# Patient Record
Sex: FEMALE | Race: WHITE | NOT HISPANIC OR LATINO | Employment: OTHER | URBAN - METROPOLITAN AREA
[De-identification: names, ages, dates, MRNs, and addresses within clinical notes are randomized per-mention and may not be internally consistent; named-entity substitution may affect disease eponyms.]

---

## 2023-03-27 LAB
ALANINE AMINOTRANSFERASE (SGPT) (U/L) IN SER/PLAS: 22 U/L (ref 7–45)
ALBUMIN (G/DL) IN SER/PLAS: 4.5 G/DL (ref 3.4–5)
ALKALINE PHOSPHATASE (U/L) IN SER/PLAS: 53 U/L (ref 33–136)
ANION GAP IN SER/PLAS: 13 MMOL/L (ref 10–20)
ASPARTATE AMINOTRANSFERASE (SGOT) (U/L) IN SER/PLAS: 23 U/L (ref 9–39)
BASOPHILS (10*3/UL) IN BLOOD BY AUTOMATED COUNT: 0.05 X10E9/L (ref 0–0.1)
BASOPHILS/100 LEUKOCYTES IN BLOOD BY AUTOMATED COUNT: 0.9 % (ref 0–2)
BILIRUBIN TOTAL (MG/DL) IN SER/PLAS: 0.7 MG/DL (ref 0–1.2)
CALCIUM (MG/DL) IN SER/PLAS: 9.8 MG/DL (ref 8.6–10.3)
CARBON DIOXIDE, TOTAL (MMOL/L) IN SER/PLAS: 30 MMOL/L (ref 21–32)
CHLORIDE (MMOL/L) IN SER/PLAS: 95 MMOL/L (ref 98–107)
CHOLESTEROL (MG/DL) IN SER/PLAS: 138 MG/DL (ref 0–199)
CHOLESTEROL IN HDL (MG/DL) IN SER/PLAS: 58.5 MG/DL
CHOLESTEROL/HDL RATIO: 2.4
CREATININE (MG/DL) IN SER/PLAS: 1.07 MG/DL (ref 0.5–1.05)
EOSINOPHILS (10*3/UL) IN BLOOD BY AUTOMATED COUNT: 0.05 X10E9/L (ref 0–0.7)
EOSINOPHILS/100 LEUKOCYTES IN BLOOD BY AUTOMATED COUNT: 0.9 % (ref 0–6)
ERYTHROCYTE DISTRIBUTION WIDTH (RATIO) BY AUTOMATED COUNT: 13.7 % (ref 11.5–14.5)
ERYTHROCYTE MEAN CORPUSCULAR HEMOGLOBIN CONCENTRATION (G/DL) BY AUTOMATED: 32.5 G/DL (ref 32–36)
ERYTHROCYTE MEAN CORPUSCULAR VOLUME (FL) BY AUTOMATED COUNT: 91 FL (ref 80–100)
ERYTHROCYTES (10*6/UL) IN BLOOD BY AUTOMATED COUNT: 4.21 X10E12/L (ref 4–5.2)
ESTIMATED AVERAGE GLUCOSE FOR HBA1C: 183 MG/DL
GFR FEMALE: 57 ML/MIN/1.73M2
GLUCOSE (MG/DL) IN SER/PLAS: 202 MG/DL (ref 74–99)
HEMATOCRIT (%) IN BLOOD BY AUTOMATED COUNT: 38.2 % (ref 36–46)
HEMOGLOBIN (G/DL) IN BLOOD: 12.4 G/DL (ref 12–16)
HEMOGLOBIN A1C/HEMOGLOBIN TOTAL IN BLOOD: 8 %
IMMATURE GRANULOCYTES/100 LEUKOCYTES IN BLOOD BY AUTOMATED COUNT: 0.3 % (ref 0–0.9)
LDL: 65 MG/DL (ref 0–99)
LEUKOCYTES (10*3/UL) IN BLOOD BY AUTOMATED COUNT: 5.8 X10E9/L (ref 4.4–11.3)
LYMPHOCYTES (10*3/UL) IN BLOOD BY AUTOMATED COUNT: 1.52 X10E9/L (ref 1.2–4.8)
LYMPHOCYTES/100 LEUKOCYTES IN BLOOD BY AUTOMATED COUNT: 26 % (ref 13–44)
MONOCYTES (10*3/UL) IN BLOOD BY AUTOMATED COUNT: 0.46 X10E9/L (ref 0.1–1)
MONOCYTES/100 LEUKOCYTES IN BLOOD BY AUTOMATED COUNT: 7.9 % (ref 2–10)
NEUTROPHILS (10*3/UL) IN BLOOD BY AUTOMATED COUNT: 3.74 X10E9/L (ref 1.2–7.7)
NEUTROPHILS/100 LEUKOCYTES IN BLOOD BY AUTOMATED COUNT: 64 % (ref 40–80)
PLATELETS (10*3/UL) IN BLOOD AUTOMATED COUNT: 306 X10E9/L (ref 150–450)
POTASSIUM (MMOL/L) IN SER/PLAS: 5.3 MMOL/L (ref 3.5–5.3)
PROTEIN TOTAL: 6.8 G/DL (ref 6.4–8.2)
SODIUM (MMOL/L) IN SER/PLAS: 133 MMOL/L (ref 136–145)
THYROTROPIN (MIU/L) IN SER/PLAS BY DETECTION LIMIT <= 0.05 MIU/L: 1.38 MIU/L (ref 0.44–3.98)
THYROXINE (T4) FREE (NG/DL) IN SER/PLAS: 1.06 NG/DL (ref 0.61–1.12)
TRIGLYCERIDE (MG/DL) IN SER/PLAS: 73 MG/DL (ref 0–149)
UREA NITROGEN (MG/DL) IN SER/PLAS: 12 MG/DL (ref 6–23)
VLDL: 15 MG/DL (ref 0–40)

## 2023-07-24 LAB
ALANINE AMINOTRANSFERASE (SGPT) (U/L) IN SER/PLAS: 19 U/L (ref 7–45)
ALBUMIN (G/DL) IN SER/PLAS: 4.7 G/DL (ref 3.4–5)
ALKALINE PHOSPHATASE (U/L) IN SER/PLAS: 55 U/L (ref 33–136)
ANION GAP IN SER/PLAS: 13 MMOL/L (ref 10–20)
ASPARTATE AMINOTRANSFERASE (SGOT) (U/L) IN SER/PLAS: 20 U/L (ref 9–39)
BASOPHILS (10*3/UL) IN BLOOD BY AUTOMATED COUNT: 0.05 X10E9/L (ref 0–0.1)
BASOPHILS/100 LEUKOCYTES IN BLOOD BY AUTOMATED COUNT: 0.9 % (ref 0–2)
BILIRUBIN TOTAL (MG/DL) IN SER/PLAS: 0.5 MG/DL (ref 0–1.2)
CALCIDIOL (25 OH VITAMIN D3) (NG/ML) IN SER/PLAS: 39 NG/ML
CALCIUM (MG/DL) IN SER/PLAS: 10.1 MG/DL (ref 8.6–10.6)
CARBON DIOXIDE, TOTAL (MMOL/L) IN SER/PLAS: 30 MMOL/L (ref 21–32)
CHLORIDE (MMOL/L) IN SER/PLAS: 102 MMOL/L (ref 98–107)
CHOLESTEROL (MG/DL) IN SER/PLAS: 162 MG/DL (ref 0–199)
CHOLESTEROL IN HDL (MG/DL) IN SER/PLAS: 68.5 MG/DL
CHOLESTEROL/HDL RATIO: 2.4
CREATININE (MG/DL) IN SER/PLAS: 0.99 MG/DL (ref 0.5–1.05)
EOSINOPHILS (10*3/UL) IN BLOOD BY AUTOMATED COUNT: 0.06 X10E9/L (ref 0–0.7)
EOSINOPHILS/100 LEUKOCYTES IN BLOOD BY AUTOMATED COUNT: 1.1 % (ref 0–6)
ERYTHROCYTE DISTRIBUTION WIDTH (RATIO) BY AUTOMATED COUNT: 13.1 % (ref 11.5–14.5)
ERYTHROCYTE MEAN CORPUSCULAR HEMOGLOBIN CONCENTRATION (G/DL) BY AUTOMATED: 31.1 G/DL (ref 32–36)
ERYTHROCYTE MEAN CORPUSCULAR VOLUME (FL) BY AUTOMATED COUNT: 93 FL (ref 80–100)
ERYTHROCYTES (10*6/UL) IN BLOOD BY AUTOMATED COUNT: 4.39 X10E12/L (ref 4–5.2)
ESTIMATED AVERAGE GLUCOSE FOR HBA1C: 177 MG/DL
GFR FEMALE: 63 ML/MIN/1.73M2
GLUCOSE (MG/DL) IN SER/PLAS: 158 MG/DL (ref 74–99)
HEMATOCRIT (%) IN BLOOD BY AUTOMATED COUNT: 40.8 % (ref 36–46)
HEMOGLOBIN (G/DL) IN BLOOD: 12.7 G/DL (ref 12–16)
HEMOGLOBIN A1C/HEMOGLOBIN TOTAL IN BLOOD: 7.8 %
IMMATURE GRANULOCYTES/100 LEUKOCYTES IN BLOOD BY AUTOMATED COUNT: 0.2 % (ref 0–0.9)
LDL: 83 MG/DL (ref 0–99)
LEUKOCYTES (10*3/UL) IN BLOOD BY AUTOMATED COUNT: 5.3 X10E9/L (ref 4.4–11.3)
LYMPHOCYTES (10*3/UL) IN BLOOD BY AUTOMATED COUNT: 1.79 X10E9/L (ref 1.2–4.8)
LYMPHOCYTES/100 LEUKOCYTES IN BLOOD BY AUTOMATED COUNT: 33.6 % (ref 13–44)
MONOCYTES (10*3/UL) IN BLOOD BY AUTOMATED COUNT: 0.37 X10E9/L (ref 0.1–1)
MONOCYTES/100 LEUKOCYTES IN BLOOD BY AUTOMATED COUNT: 7 % (ref 2–10)
NEUTROPHILS (10*3/UL) IN BLOOD BY AUTOMATED COUNT: 3.04 X10E9/L (ref 1.2–7.7)
NEUTROPHILS/100 LEUKOCYTES IN BLOOD BY AUTOMATED COUNT: 57.2 % (ref 40–80)
NRBC (PER 100 WBCS) BY AUTOMATED COUNT: 0 /100 WBC (ref 0–0)
PLATELETS (10*3/UL) IN BLOOD AUTOMATED COUNT: 263 X10E9/L (ref 150–450)
POTASSIUM (MMOL/L) IN SER/PLAS: 5 MMOL/L (ref 3.5–5.3)
PROTEIN TOTAL: 6.8 G/DL (ref 6.4–8.2)
SODIUM (MMOL/L) IN SER/PLAS: 140 MMOL/L (ref 136–145)
THYROTROPIN (MIU/L) IN SER/PLAS BY DETECTION LIMIT <= 0.05 MIU/L: 1.37 MIU/L (ref 0.44–3.98)
THYROXINE (T4) FREE (NG/DL) IN SER/PLAS: 1.07 NG/DL (ref 0.78–1.48)
TRIGLYCERIDE (MG/DL) IN SER/PLAS: 54 MG/DL (ref 0–149)
UREA NITROGEN (MG/DL) IN SER/PLAS: 13 MG/DL (ref 6–23)
VLDL: 11 MG/DL (ref 0–40)

## 2023-10-24 DIAGNOSIS — R03.0 BLOOD PRESSURE ELEVATED WITHOUT HISTORY OF HTN: ICD-10-CM

## 2023-10-24 RX ORDER — LOSARTAN POTASSIUM 50 MG/1
50 TABLET ORAL DAILY
Qty: 90 TABLET | Refills: 0 | Status: SHIPPED | OUTPATIENT
Start: 2023-10-24 | End: 2024-01-08 | Stop reason: SDUPTHER

## 2023-10-24 RX ORDER — LOSARTAN POTASSIUM 50 MG/1
1 TABLET ORAL DAILY
COMMUNITY
Start: 2022-04-26 | End: 2023-10-24 | Stop reason: SDUPTHER

## 2023-10-30 ENCOUNTER — TELEPHONE (OUTPATIENT)
Dept: ENDOCRINOLOGY | Facility: CLINIC | Age: 68
End: 2023-10-30
Payer: MEDICARE

## 2023-10-30 RX ORDER — IBANDRONATE SODIUM 150 MG/1
TABLET, FILM COATED ORAL
COMMUNITY
End: 2023-11-27 | Stop reason: SDUPTHER

## 2023-10-30 RX ORDER — PEN NEEDLE, DIABETIC 32GX 5/32"
NEEDLE, DISPOSABLE MISCELLANEOUS
COMMUNITY
Start: 2023-09-12 | End: 2024-01-05 | Stop reason: SDUPTHER

## 2023-10-30 RX ORDER — VALACYCLOVIR HYDROCHLORIDE 1 G/1
1000 TABLET, FILM COATED ORAL DAILY
COMMUNITY

## 2023-10-30 RX ORDER — LANOLIN ALCOHOL/MO/W.PET/CERES
CREAM (GRAM) TOPICAL
COMMUNITY

## 2023-10-30 RX ORDER — ATORVASTATIN CALCIUM 40 MG/1
40 TABLET, FILM COATED ORAL DAILY
COMMUNITY
End: 2023-12-19 | Stop reason: SDUPTHER

## 2023-10-30 RX ORDER — INSULIN LISPRO 100 [IU]/ML
INJECTION, SOLUTION INTRAVENOUS; SUBCUTANEOUS
COMMUNITY

## 2023-10-30 RX ORDER — INSULIN GLARGINE 100 [IU]/ML
30 INJECTION, SOLUTION SUBCUTANEOUS NIGHTLY
COMMUNITY

## 2023-10-30 RX ORDER — BLOOD SUGAR DIAGNOSTIC
STRIP MISCELLANEOUS
COMMUNITY
Start: 2018-02-28

## 2023-10-30 RX ORDER — FLASH GLUCOSE SCANNING READER
EACH MISCELLANEOUS
COMMUNITY
Start: 2023-07-31

## 2023-10-30 RX ORDER — SEMAGLUTIDE 0.68 MG/ML
INJECTION, SOLUTION SUBCUTANEOUS
COMMUNITY
Start: 2023-10-19 | End: 2023-12-14 | Stop reason: SDUPTHER

## 2023-10-30 RX ORDER — FLASH GLUCOSE SENSOR
KIT MISCELLANEOUS
COMMUNITY
Start: 2023-08-15 | End: 2023-12-28 | Stop reason: SDUPTHER

## 2023-10-30 NOTE — TELEPHONE ENCOUNTER
Pt received her letters of denial for lantus and humalog. Pt would like to speak directly to you to see what you ot she can do to continue her on brand name insulin vs generics or alternative.

## 2023-11-27 ENCOUNTER — TELEPHONE (OUTPATIENT)
Dept: ENDOCRINOLOGY | Facility: CLINIC | Age: 68
End: 2023-11-27
Payer: MEDICARE

## 2023-11-27 DIAGNOSIS — E55.9 MILD VITAMIN D DEFICIENCY: ICD-10-CM

## 2023-11-27 DIAGNOSIS — E11.00 TYPE II DIABETES MELLITUS WITH HYPEROSMOLARITY, UNCONTROLLED (MULTI): ICD-10-CM

## 2023-11-27 DIAGNOSIS — E10.9 TYPE 1 DIABETES MELLITUS WITHOUT COMPLICATION (MULTI): Primary | ICD-10-CM

## 2023-11-27 DIAGNOSIS — I10 ESSENTIAL HYPERTENSION, BENIGN: ICD-10-CM

## 2023-11-27 DIAGNOSIS — D64.9 FATIGUE ASSOCIATED WITH ANEMIA: ICD-10-CM

## 2023-11-27 DIAGNOSIS — M81.0 OSTEOPOROSIS, UNSPECIFIED OSTEOPOROSIS TYPE, UNSPECIFIED PATHOLOGICAL FRACTURE PRESENCE: Primary | ICD-10-CM

## 2023-11-27 RX ORDER — IBANDRONATE SODIUM 150 MG/1
TABLET, FILM COATED ORAL
Qty: 3 TABLET | Refills: 3 | Status: SHIPPED | OUTPATIENT
Start: 2023-11-27

## 2023-11-27 NOTE — TELEPHONE ENCOUNTER
Pt has ov on 12/11/23 and would like to have any needed blood work done prior to OV. Patient states she will be fasting.

## 2023-12-04 ENCOUNTER — LAB (OUTPATIENT)
Dept: LAB | Facility: LAB | Age: 68
End: 2023-12-04
Payer: MEDICARE

## 2023-12-04 DIAGNOSIS — E55.9 MILD VITAMIN D DEFICIENCY: ICD-10-CM

## 2023-12-04 DIAGNOSIS — E10.9 TYPE 1 DIABETES MELLITUS WITHOUT COMPLICATION (MULTI): ICD-10-CM

## 2023-12-04 DIAGNOSIS — E11.00 TYPE II DIABETES MELLITUS WITH HYPEROSMOLARITY, UNCONTROLLED (MULTI): ICD-10-CM

## 2023-12-04 DIAGNOSIS — D64.9 FATIGUE ASSOCIATED WITH ANEMIA: ICD-10-CM

## 2023-12-04 DIAGNOSIS — I10 ESSENTIAL HYPERTENSION, BENIGN: ICD-10-CM

## 2023-12-04 LAB
25(OH)D3 SERPL-MCNC: 35 NG/ML (ref 30–100)
ALBUMIN SERPL BCP-MCNC: 4.8 G/DL (ref 3.4–5)
ALP SERPL-CCNC: 51 U/L (ref 33–136)
ALT SERPL W P-5'-P-CCNC: 36 U/L (ref 7–45)
ANION GAP SERPL CALC-SCNC: 12 MMOL/L (ref 10–20)
AST SERPL W P-5'-P-CCNC: 26 U/L (ref 9–39)
BASOPHILS # BLD AUTO: 0.05 X10*3/UL (ref 0–0.1)
BASOPHILS NFR BLD AUTO: 1 %
BILIRUB SERPL-MCNC: 0.6 MG/DL (ref 0–1.2)
BUN SERPL-MCNC: 16 MG/DL (ref 6–23)
CALCIUM SERPL-MCNC: 9.9 MG/DL (ref 8.6–10.6)
CHLORIDE SERPL-SCNC: 101 MMOL/L (ref 98–107)
CO2 SERPL-SCNC: 29 MMOL/L (ref 21–32)
CREAT SERPL-MCNC: 0.88 MG/DL (ref 0.5–1.05)
EOSINOPHIL # BLD AUTO: 0.07 X10*3/UL (ref 0–0.7)
EOSINOPHIL NFR BLD AUTO: 1.3 %
ERYTHROCYTE [DISTWIDTH] IN BLOOD BY AUTOMATED COUNT: 13.2 % (ref 11.5–14.5)
EST. AVERAGE GLUCOSE BLD GHB EST-MCNC: 163 MG/DL
GFR SERPL CREATININE-BSD FRML MDRD: 72 ML/MIN/1.73M*2
GLUCOSE SERPL-MCNC: 198 MG/DL (ref 74–99)
HBA1C MFR BLD: 7.3 %
HCT VFR BLD AUTO: 39.9 % (ref 36–46)
HGB BLD-MCNC: 12.5 G/DL (ref 12–16)
IMM GRANULOCYTES # BLD AUTO: 0.01 X10*3/UL (ref 0–0.7)
IMM GRANULOCYTES NFR BLD AUTO: 0.2 % (ref 0–0.9)
LYMPHOCYTES # BLD AUTO: 1.45 X10*3/UL (ref 1.2–4.8)
LYMPHOCYTES NFR BLD AUTO: 27.6 %
MCH RBC QN AUTO: 29.8 PG (ref 26–34)
MCHC RBC AUTO-ENTMCNC: 31.3 G/DL (ref 32–36)
MCV RBC AUTO: 95 FL (ref 80–100)
MONOCYTES # BLD AUTO: 0.36 X10*3/UL (ref 0.1–1)
MONOCYTES NFR BLD AUTO: 6.9 %
NEUTROPHILS # BLD AUTO: 3.31 X10*3/UL (ref 1.2–7.7)
NEUTROPHILS NFR BLD AUTO: 63 %
NRBC BLD-RTO: 0 /100 WBCS (ref 0–0)
PLATELET # BLD AUTO: 251 X10*3/UL (ref 150–450)
POTASSIUM SERPL-SCNC: 5 MMOL/L (ref 3.5–5.3)
PROT SERPL-MCNC: 6.7 G/DL (ref 6.4–8.2)
RBC # BLD AUTO: 4.19 X10*6/UL (ref 4–5.2)
SODIUM SERPL-SCNC: 137 MMOL/L (ref 136–145)
T4 FREE SERPL-MCNC: 0.9 NG/DL (ref 0.78–1.48)
TSH SERPL-ACNC: 1.54 MIU/L (ref 0.44–3.98)
WBC # BLD AUTO: 5.3 X10*3/UL (ref 4.4–11.3)

## 2023-12-04 PROCEDURE — 84439 ASSAY OF FREE THYROXINE: CPT

## 2023-12-04 PROCEDURE — 84443 ASSAY THYROID STIM HORMONE: CPT

## 2023-12-04 PROCEDURE — 36415 COLL VENOUS BLD VENIPUNCTURE: CPT

## 2023-12-04 PROCEDURE — 82306 VITAMIN D 25 HYDROXY: CPT

## 2023-12-04 PROCEDURE — 83036 HEMOGLOBIN GLYCOSYLATED A1C: CPT

## 2023-12-04 PROCEDURE — 80053 COMPREHEN METABOLIC PANEL: CPT

## 2023-12-04 PROCEDURE — 85025 COMPLETE CBC W/AUTO DIFF WBC: CPT

## 2023-12-11 ENCOUNTER — OFFICE VISIT (OUTPATIENT)
Dept: ENDOCRINOLOGY | Facility: CLINIC | Age: 68
End: 2023-12-11
Payer: MEDICARE

## 2023-12-11 VITALS
RESPIRATION RATE: 16 BRPM | WEIGHT: 141.8 LBS | HEIGHT: 67 IN | SYSTOLIC BLOOD PRESSURE: 112 MMHG | HEART RATE: 76 BPM | DIASTOLIC BLOOD PRESSURE: 78 MMHG | BODY MASS INDEX: 22.26 KG/M2

## 2023-12-11 DIAGNOSIS — E78.5 HYPERLIPIDEMIA, UNSPECIFIED HYPERLIPIDEMIA TYPE: ICD-10-CM

## 2023-12-11 DIAGNOSIS — E10.9 TYPE 1 DIABETES MELLITUS WITHOUT COMPLICATION (MULTI): Primary | ICD-10-CM

## 2023-12-11 DIAGNOSIS — E55.9 VITAMIN D DEFICIENCY: ICD-10-CM

## 2023-12-11 DIAGNOSIS — I10 HYPERTENSION, UNSPECIFIED TYPE: ICD-10-CM

## 2023-12-11 PROCEDURE — 3051F HG A1C>EQUAL 7.0%<8.0%: CPT | Performed by: INTERNAL MEDICINE

## 2023-12-11 PROCEDURE — 3078F DIAST BP <80 MM HG: CPT | Performed by: INTERNAL MEDICINE

## 2023-12-11 PROCEDURE — 1126F AMNT PAIN NOTED NONE PRSNT: CPT | Performed by: INTERNAL MEDICINE

## 2023-12-11 PROCEDURE — 99214 OFFICE O/P EST MOD 30 MIN: CPT | Performed by: INTERNAL MEDICINE

## 2023-12-11 PROCEDURE — 1036F TOBACCO NON-USER: CPT | Performed by: INTERNAL MEDICINE

## 2023-12-11 PROCEDURE — 4010F ACE/ARB THERAPY RXD/TAKEN: CPT | Performed by: INTERNAL MEDICINE

## 2023-12-11 PROCEDURE — 1159F MED LIST DOCD IN RCRD: CPT | Performed by: INTERNAL MEDICINE

## 2023-12-11 PROCEDURE — 1160F RVW MEDS BY RX/DR IN RCRD: CPT | Performed by: INTERNAL MEDICINE

## 2023-12-11 PROCEDURE — 3074F SYST BP LT 130 MM HG: CPT | Performed by: INTERNAL MEDICINE

## 2023-12-11 ASSESSMENT — ENCOUNTER SYMPTOMS
NAUSEA: 0
VOMITING: 0
COUGH: 0
FEVER: 0
CHILLS: 0
FATIGUE: 0
HEADACHES: 0
PALPITATIONS: 0
SHORTNESS OF BREATH: 0
DIARRHEA: 0

## 2023-12-11 NOTE — PROGRESS NOTES
"Endocrinology: Follow up visit  Subjective   Patient ID: Natacha Rangel is a 68 y.o. female who presents for Diabetes (Type 1 ), Hyperlipidemia, Hypertension, and Hypothyroidism.    PCP: Godwin Londono MD    HPI  Since last visit  doing great with sugars.  A1c much improved.   No recent severe lows.  Feeling well.   No complaints.      Review of Systems   Constitutional:  Negative for chills, fatigue and fever.   Respiratory:  Negative for cough and shortness of breath.    Cardiovascular:  Negative for chest pain and palpitations.   Gastrointestinal:  Negative for diarrhea, nausea and vomiting.   Neurological:  Negative for headaches.       There is no problem list on file for this patient.       Home Meds:  Current Outpatient Medications   Medication Instructions    atorvastatin (LIPITOR) 40 mg, oral, Daily    cyanocobalamin (Vitamin B-12) 1,000 mcg tablet oral    Droplet Pen Needle 32 gauge x 5/32\" needle use with INSULIN four times a day    FreeStyle Kimberly 2 Abbot misc USE CONTINUOUSLY TO MONITOR BLOOD SUGARS DAILY    FreeStyle Kimberly 2 Sensor kit apply 1 SENSOR to back OF UPPER ARM REMOVE AND REPLACE every 14 d...  (REFER TO PRESCRIPTION NOTES).    HumaLOG KwikPen Insulin 100 unit/mL injection inject up to 30 units daily as directed per sliding scale    ibandronate (Boniva) 150 mg tablet take 1 tablet by mouth every month ON THE SAME DATE WITH FULL GLA...  (REFER TO PRESCRIPTION NOTES).    Lantus Solostar U-100 Insulin 30 Units, subcutaneous, Nightly    losartan (COZAAR) 50 mg, oral, Daily    OneTouch Ultra Test strip test every 2 hours dx:E10.9,c79.9    Ozempic 0.25 mg or 0.5 mg (2 mg/3 mL) pen injector inject 0.25 milligram subcutaneously every week    valACYclovir (VALTREX) 1,000 mg, oral, Daily        No Known Allergies     Objective   Vitals:    12/11/23 1317   BP: 112/78   Pulse: 76   Resp: 16      Vitals:    12/11/23 1317   Weight: 64.3 kg (141 lb 12.8 oz)      Body mass index is 22.54 kg/m². "   Physical Exam  Constitutional:       Appearance: Normal appearance. She is overweight.   HENT:      Head: Normocephalic and atraumatic.   Neck:      Thyroid: No thyroid mass, thyromegaly or thyroid tenderness.   Cardiovascular:      Rate and Rhythm: Normal rate and regular rhythm.      Heart sounds: No murmur heard.     No gallop.   Pulmonary:      Effort: Pulmonary effort is normal.      Breath sounds: Normal breath sounds.   Abdominal:      Palpations: Abdomen is soft.      Comments: benign   Neurological:      General: No focal deficit present.      Mental Status: She is alert and oriented to person, place, and time.      Deep Tendon Reflexes: Reflexes are normal and symmetric.   Psychiatric:         Behavior: Behavior is cooperative.         Labs:  Lab Results   Component Value Date    HGBA1C 7.3 (H) 12/04/2023    TSH 1.54 12/04/2023    FREET4 0.90 12/04/2023      Lab Results   Component Value Date    PR1 AMRIT 03/14/2018        Assessment/Plan   Problem List Items Addressed This Visit    None  Visit Diagnoses       Type 1 diabetes mellitus without complication (CMS/[facility])    -  Primary    Relevant Orders    Comprehensive Metabolic Panel    Hemoglobin A1C    Vitamin D deficiency        Relevant Orders    Vitamin D 25-Hydroxy,Total (for eval of Vitamin D levels)    Hypertension, unspecified type        Hyperlipidemia, unspecified hyperlipidemia type              Dm1:   Discussed course.  A1c is great  Reassured no issues with biosimilar insulins anticipated  Htn:  Bp excellent  Lipids:  Continue statin  Osteoporosis:  Continue boniva  Follow up in 4 months  Electronically signed by:  Vielka Arevalo MD 12/11/23 1:50 PM

## 2023-12-14 DIAGNOSIS — E10.9 TYPE 1 DIABETES MELLITUS WITHOUT COMPLICATION (MULTI): Primary | ICD-10-CM

## 2023-12-14 RX ORDER — SEMAGLUTIDE 0.68 MG/ML
0.5 INJECTION, SOLUTION SUBCUTANEOUS
Qty: 9 ML | Refills: 3 | Status: SHIPPED | OUTPATIENT
Start: 2023-12-14 | End: 2024-12-13

## 2023-12-18 ENCOUNTER — OFFICE VISIT (OUTPATIENT)
Dept: PRIMARY CARE | Facility: CLINIC | Age: 68
End: 2023-12-18
Payer: MEDICARE

## 2023-12-18 VITALS
RESPIRATION RATE: 16 BRPM | WEIGHT: 141 LBS | HEIGHT: 66 IN | DIASTOLIC BLOOD PRESSURE: 70 MMHG | BODY MASS INDEX: 22.66 KG/M2 | SYSTOLIC BLOOD PRESSURE: 120 MMHG | HEART RATE: 72 BPM

## 2023-12-18 DIAGNOSIS — I10 PRIMARY HYPERTENSION: Primary | ICD-10-CM

## 2023-12-18 DIAGNOSIS — F41.1 GENERALIZED ANXIETY DISORDER: ICD-10-CM

## 2023-12-18 DIAGNOSIS — E78.00 ELEVATED LDL CHOLESTEROL LEVEL: ICD-10-CM

## 2023-12-18 DIAGNOSIS — E10.59 TYPE 1 DIABETES MELLITUS WITH OTHER CIRCULATORY COMPLICATION (MULTI): ICD-10-CM

## 2023-12-18 DIAGNOSIS — C82.32: ICD-10-CM

## 2023-12-18 PROBLEM — T50.Z95A IMMUNIZATION REACTION: Status: ACTIVE | Noted: 2023-12-18

## 2023-12-18 PROBLEM — R51.9 HEADACHE: Status: ACTIVE | Noted: 2023-12-18

## 2023-12-18 PROBLEM — R73.9 HYPERGLYCEMIA: Status: ACTIVE | Noted: 2023-12-18

## 2023-12-18 PROBLEM — J30.2 SEASONAL ALLERGIES: Status: ACTIVE | Noted: 2023-12-18

## 2023-12-18 PROBLEM — Z85.3 HISTORY OF BREAST CANCER: Status: ACTIVE | Noted: 2023-12-18

## 2023-12-18 PROBLEM — F41.9 ANXIETY DISORDER: Status: ACTIVE | Noted: 2023-12-18

## 2023-12-18 PROBLEM — M81.0 OSTEOPOROSIS: Status: ACTIVE | Noted: 2023-12-18

## 2023-12-18 PROBLEM — E55.9 VITAMIN D DEFICIENCY: Status: ACTIVE | Noted: 2023-12-18

## 2023-12-18 PROBLEM — D48.5 NEOPLASM OF UNCERTAIN BEHAVIOR OF SKIN: Status: ACTIVE | Noted: 2018-12-13

## 2023-12-18 PROBLEM — L03.011 PARONYCHIA OF FINGER OF RIGHT HAND: Status: ACTIVE | Noted: 2023-12-18

## 2023-12-18 PROBLEM — L29.9 ITCH OF SKIN: Status: ACTIVE | Noted: 2023-12-18

## 2023-12-18 PROBLEM — R59.1 GENERALIZED LYMPHADENOPATHY: Status: ACTIVE | Noted: 2023-12-18

## 2023-12-18 PROBLEM — E11.649 DIABETIC HYPOGLYCEMIA (MULTI): Status: ACTIVE | Noted: 2023-12-18

## 2023-12-18 PROBLEM — E16.2 HYPOGLYCEMIA: Status: ACTIVE | Noted: 2023-12-18

## 2023-12-18 PROBLEM — C44.519 BASAL CELL CARCINOMA OF SKIN OF OTHER PART OF TRUNK: Status: ACTIVE | Noted: 2018-12-13

## 2023-12-18 PROBLEM — S93.401A SPRAIN OF RIGHT ANKLE: Status: ACTIVE | Noted: 2023-12-18

## 2023-12-18 PROBLEM — L90.5 SCAR CONDITION AND FIBROSIS OF SKIN: Status: ACTIVE | Noted: 2018-12-13

## 2023-12-18 PROBLEM — E10.9 TYPE 1 DIABETES MELLITUS (MULTI): Status: ACTIVE | Noted: 2017-06-12

## 2023-12-18 PROBLEM — R35.0 URINARY FREQUENCY: Status: ACTIVE | Noted: 2023-12-18

## 2023-12-18 PROBLEM — C82.90 FOLLICULAR LYMPHOMA (MULTI): Status: ACTIVE | Noted: 2023-12-18

## 2023-12-18 PROBLEM — L03.019: Status: ACTIVE | Noted: 2023-12-18

## 2023-12-18 PROBLEM — M25.571 ACUTE RIGHT ANKLE PAIN: Status: ACTIVE | Noted: 2023-12-18

## 2023-12-18 PROBLEM — G44.209 TENSION HEADACHE: Status: ACTIVE | Noted: 2023-12-18

## 2023-12-18 PROBLEM — M79.644 THUMB PAIN, RIGHT: Status: ACTIVE | Noted: 2023-12-18

## 2023-12-18 PROBLEM — L08.9 INFECTION OF THUMB: Status: ACTIVE | Noted: 2023-12-18

## 2023-12-18 PROBLEM — L03.011 CELLULITIS OF RIGHT THUMB: Status: ACTIVE | Noted: 2023-12-18

## 2023-12-18 PROBLEM — T50.905A DRUG REACTION: Status: ACTIVE | Noted: 2023-12-18

## 2023-12-18 PROBLEM — D22.5 MELANOCYTIC NEVI OF TRUNK: Status: ACTIVE | Noted: 2018-12-13

## 2023-12-18 PROBLEM — Z85.820 PERSONAL HISTORY OF MALIGNANT MELANOMA OF SKIN: Status: ACTIVE | Noted: 2018-12-13

## 2023-12-18 PROBLEM — R22.2 CHEST WALL MASS: Status: ACTIVE | Noted: 2023-12-18

## 2023-12-18 PROBLEM — R55 SYNCOPE AND COLLAPSE: Status: ACTIVE | Noted: 2023-12-18

## 2023-12-18 PROBLEM — H57.02 ANISOCORIA: Status: ACTIVE | Noted: 2023-12-18

## 2023-12-18 PROBLEM — M79.671 RIGHT FOOT PAIN: Status: ACTIVE | Noted: 2023-12-18

## 2023-12-18 PROBLEM — J90 PLEURAL EFFUSION: Status: ACTIVE | Noted: 2023-12-18

## 2023-12-18 PROBLEM — C79.9 METASTATIC MALIGNANT NEOPLASM (MULTI): Status: ACTIVE | Noted: 2023-12-18

## 2023-12-18 PROBLEM — L81.4 OTHER MELANIN HYPERPIGMENTATION: Status: ACTIVE | Noted: 2018-12-13

## 2023-12-18 PROBLEM — R03.0 BLOOD PRESSURE ELEVATED WITHOUT HISTORY OF HTN: Status: ACTIVE | Noted: 2023-12-18

## 2023-12-18 PROBLEM — M89.8X9 BONE MASS: Status: ACTIVE | Noted: 2023-12-18

## 2023-12-18 PROBLEM — L82.1 OTHER SEBORRHEIC KERATOSIS: Status: ACTIVE | Noted: 2018-12-13

## 2023-12-18 PROCEDURE — 3051F HG A1C>EQUAL 7.0%<8.0%: CPT | Performed by: INTERNAL MEDICINE

## 2023-12-18 PROCEDURE — 1126F AMNT PAIN NOTED NONE PRSNT: CPT | Performed by: INTERNAL MEDICINE

## 2023-12-18 PROCEDURE — 4010F ACE/ARB THERAPY RXD/TAKEN: CPT | Performed by: INTERNAL MEDICINE

## 2023-12-18 PROCEDURE — 1160F RVW MEDS BY RX/DR IN RCRD: CPT | Performed by: INTERNAL MEDICINE

## 2023-12-18 PROCEDURE — 1159F MED LIST DOCD IN RCRD: CPT | Performed by: INTERNAL MEDICINE

## 2023-12-18 PROCEDURE — 3074F SYST BP LT 130 MM HG: CPT | Performed by: INTERNAL MEDICINE

## 2023-12-18 PROCEDURE — 3078F DIAST BP <80 MM HG: CPT | Performed by: INTERNAL MEDICINE

## 2023-12-18 PROCEDURE — 1036F TOBACCO NON-USER: CPT | Performed by: INTERNAL MEDICINE

## 2023-12-18 PROCEDURE — 99214 OFFICE O/P EST MOD 30 MIN: CPT | Performed by: INTERNAL MEDICINE

## 2023-12-18 ASSESSMENT — ENCOUNTER SYMPTOMS
RESPIRATORY NEGATIVE: 1
HEMATOLOGIC/LYMPHATIC NEGATIVE: 1
GASTROINTESTINAL NEGATIVE: 1
CONSTITUTIONAL NEGATIVE: 1
CARDIOVASCULAR NEGATIVE: 1
NEUROLOGICAL NEGATIVE: 1
EYES NEGATIVE: 1
MUSCULOSKELETAL NEGATIVE: 1
ALLERGIC/IMMUNOLOGIC NEGATIVE: 1
PSYCHIATRIC NEGATIVE: 1
ENDOCRINE NEGATIVE: 1

## 2023-12-18 NOTE — ASSESSMENT & PLAN NOTE
Anxiety Disorder - +/- Depresion.  the patient extensively.

## 2023-12-18 NOTE — ASSESSMENT & PLAN NOTE
Hypercholesterolemia - Monitor lipid profile and educate patient upon risks of high cholesterol and targets. Educate diet and change in lifestyle and increase in exercises - Refill: Atorvastatin     and educate compliance with medication and diet.

## 2023-12-18 NOTE — PROGRESS NOTES
"Subjective   Patient ID: Natacha Rangel is a 68 y.o. female who presents for Follow-up (Follow up).    HPI     Review of Systems   Constitutional: Negative.    HENT: Negative.     Eyes: Negative.    Respiratory: Negative.     Cardiovascular: Negative.    Gastrointestinal: Negative.    Endocrine: Negative.    Musculoskeletal: Negative.    Skin: Negative.    Allergic/Immunologic: Negative.    Neurological: Negative.    Hematological: Negative.    Psychiatric/Behavioral: Negative.     All other systems reviewed and are negative.      Objective   Ht 1.676 m (5' 6\")   Wt 64 kg (141 lb)   BMI 22.76 kg/m²   Blood pressure 120/70, pulse 72, resp. rate 16, height 1.676 m (5' 6\"), weight 64 kg (141 lb).   Physical Exam  Vitals and nursing note reviewed.   Constitutional:       Appearance: Normal appearance.   HENT:      Head: Normocephalic and atraumatic.      Right Ear: Tympanic membrane, ear canal and external ear normal.      Left Ear: Tympanic membrane, ear canal and external ear normal. There is no impacted cerumen.      Nose: Nose normal.      Mouth/Throat:      Mouth: Mucous membranes are moist.      Pharynx: Oropharynx is clear.   Eyes:      Extraocular Movements: Extraocular movements intact.      Conjunctiva/sclera: Conjunctivae normal.      Pupils: Pupils are equal, round, and reactive to light.   Cardiovascular:      Rate and Rhythm: Normal rate and regular rhythm.      Pulses: Normal pulses.      Heart sounds: Normal heart sounds. No murmur heard.  Pulmonary:      Effort: Pulmonary effort is normal. No respiratory distress.      Breath sounds: Normal breath sounds. No stridor. No wheezing, rhonchi or rales.   Chest:      Chest wall: No tenderness.   Abdominal:      General: Abdomen is flat. Bowel sounds are normal. There is no distension.      Palpations: Abdomen is soft. There is no mass.      Tenderness: There is no abdominal tenderness. There is no right CVA tenderness, left CVA tenderness, guarding or " rebound.      Hernia: No hernia is present.   Musculoskeletal:         General: Normal range of motion.      Cervical back: Normal range of motion and neck supple.   Skin:     General: Skin is warm.      Capillary Refill: Capillary refill takes less than 2 seconds.   Neurological:      General: No focal deficit present.      Mental Status: She is alert.      Cranial Nerves: No cranial nerve deficit.      Sensory: No sensory deficit.      Motor: No weakness.      Coordination: Coordination normal.      Gait: Gait normal.      Deep Tendon Reflexes: Reflexes normal.   Psychiatric:         Mood and Affect: Mood normal.         Behavior: Behavior normal. Behavior is cooperative.         Thought Content: Thought content normal.         Judgment: Judgment normal.         Assessment/Plan   Problem List Items Addressed This Visit             ICD-10-CM    Anxiety disorder F41.9     Anxiety Disorder - +/- Depresion.  the patient extensively.                                                                        Elevated LDL cholesterol level E78.00     Hypercholesterolemia - Monitor lipid profile and educate patient upon risks of high cholesterol and targets. Educate diet and change in lifestyle and increase in exercises - Refill: Atorvastatin     and educate compliance with medication and diet.           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 Educate low salt diet and exercise with weight loss. Educate home self monitoring and diary keeping. Educate risks of elevate blood pressure and benefits of prompt treatment.          Type 1 diabetes mellitus (CMS/[facility]) E10.9     DM - IDDM  . Reviewed with patient / Will check  HbA1c at 7.3% in December 2023 and fasting blood sugars. Educate home self monitoring and diary keeping(reviewed with patient home blood  sugar levels /diary). Educate extensively low calorie diet and weight loss with exercise. Reviewed BS- diary and Rx. Regimen. [place] renal protection with ARB/ACEI.               Educate compliance with diet and Rx. And educate risks and autcomes.   Refill Ozempic and Insulin and doing much batter                                                  CHOL. - Hypercholesterolemia. Check lipid profile and LFT's. Target LDL at < 100 mg/dl. Educate low cholesterol diet (low calorie and low fat). Educate weight loss and exercise. Educate weight self monitoring. Educate risks and outcomes. Refill: Pravastatin 40 mg daily.      HTN - hypertension well/poorly controlled.Target BP < 130/80 well/not achieved. Educate low salt diet and exercise with weight loss. Restart ARB/ Losartan Educate home self monitoring and diary keeping.   Anxiety Disorder - +/- Depresion.  the patient extensively. refer to Psychologist.      Lymphoma - Follicular - status post chemotherapy and follows with hematology oncology - feels very well and recovered and now she is cancer free -      DM - IDDM - Hyperglycemia improved - Will review the HbA1c at 8.0 % on 03/27/2023 and fasting blood sugars. Educate home self monitoring and diary keeping. Educate extensively low calorie diet and weight loss with exercise. Reviewed BS- diary and Rx. Regimen. [place] renal protection with ARB/ACEI. Refill: Insulin , Educate compliance with diet and Rx. And educate risks and outcomes.      Left ankle edema and swelling - recommend: X-rays and Ultrasound of the leg and rule out DVT - and circulation problems - recommend: leg elevation and compression stockings -   Leg edema and swelling - due to Venous Insufficiency. Improved and feels well - now - Recommend Compression stockings at 18-30 mmHg( and elevation of legs. Avoid standing still.Recommend walking and increase in exercise activity and weight loss. Also rule out sleep apnea and nocturnal hypoxia with  overnight oximetry.Avoid sitting with legs down for along period of time (more than 30 to 40 minutes at the time). Recommend low salt diet -            High complexity visit of 30 minutes face-to-face with at least half of the time discussing my clinical findings, diagnosis and results of the blood work. The patient demonstrated good understanding of instructions and plans.      Risk Factors Identified During Visit: None.   Influenza: influenza vaccine was previously given.   Pneumovax 23/Prevnar 15: Pneumovax 23/Prevnar 15 vaccine was ordered.   Prevnar 20: Prevnar 20 vaccine was ordered.   Shingles Vaccine: Shingles vaccine was ordered.   Breast cancer screening: screening is current.   Cervical cancer screening: screening is current.   Osteoporosis screening: screening is current.   Colorectal cancer screening: screening ordered~and~an not fast - we will get a cologuard -.   HIV screening: screening not indicated.

## 2023-12-18 NOTE — ASSESSMENT & PLAN NOTE
DM - IDDM  . Reviewed with patient / Will check  HbA1c at 7.3% in December 2023 and fasting blood sugars. Educate home self monitoring and diary keeping(reviewed with patient home blood sugar levels /diary). Educate extensively low calorie diet and weight loss with exercise. Reviewed BS- diary and Rx. Regimen. [place] renal protection with ARB/ACEI.               Educate compliance with diet and Rx. And educate risks and autcomes.   Refill Ozempic and Insulin and doing much batter

## 2023-12-19 ENCOUNTER — TELEPHONE (OUTPATIENT)
Dept: PRIMARY CARE | Facility: CLINIC | Age: 68
End: 2023-12-19
Payer: MEDICARE

## 2023-12-19 DIAGNOSIS — I10 PRIMARY HYPERTENSION: ICD-10-CM

## 2023-12-19 DIAGNOSIS — E78.00 ELEVATED LDL CHOLESTEROL LEVEL: ICD-10-CM

## 2023-12-19 RX ORDER — ATORVASTATIN CALCIUM 40 MG/1
40 TABLET, FILM COATED ORAL DAILY
Qty: 90 TABLET | Refills: 0 | Status: SHIPPED | OUTPATIENT
Start: 2023-12-19 | End: 2024-01-08 | Stop reason: SDUPTHER

## 2023-12-28 DIAGNOSIS — E10.9 TYPE 1 DIABETES MELLITUS WITHOUT COMPLICATION (MULTI): Primary | ICD-10-CM

## 2023-12-28 RX ORDER — FLASH GLUCOSE SENSOR
KIT MISCELLANEOUS
Qty: 1 EACH | Refills: 0 | Status: SHIPPED | OUTPATIENT
Start: 2023-12-28

## 2024-01-02 ENCOUNTER — TELEPHONE (OUTPATIENT)
Dept: PRIMARY CARE | Facility: CLINIC | Age: 69
End: 2024-01-02
Payer: MEDICARE

## 2024-01-02 DIAGNOSIS — R03.0 BLOOD PRESSURE ELEVATED WITHOUT HISTORY OF HTN: ICD-10-CM

## 2024-01-02 DIAGNOSIS — E78.00 ELEVATED LDL CHOLESTEROL LEVEL: ICD-10-CM

## 2024-01-05 DIAGNOSIS — E10.9 TYPE 1 DIABETES MELLITUS WITHOUT COMPLICATION (MULTI): Primary | ICD-10-CM

## 2024-01-05 RX ORDER — PEN NEEDLE, DIABETIC 30 GX3/16"
NEEDLE, DISPOSABLE MISCELLANEOUS
Qty: 400 EACH | Refills: 3 | Status: SHIPPED | OUTPATIENT
Start: 2024-01-05 | End: 2024-04-15 | Stop reason: SDUPTHER

## 2024-01-08 RX ORDER — LOSARTAN POTASSIUM 50 MG/1
50 TABLET ORAL DAILY
Qty: 90 TABLET | Refills: 0 | Status: SHIPPED | OUTPATIENT
Start: 2024-01-08 | End: 2024-02-14 | Stop reason: SDUPTHER

## 2024-01-08 RX ORDER — ATORVASTATIN CALCIUM 40 MG/1
40 TABLET, FILM COATED ORAL DAILY
Qty: 90 TABLET | Refills: 0 | Status: SHIPPED | OUTPATIENT
Start: 2024-01-08 | End: 2024-03-25

## 2024-02-14 DIAGNOSIS — R03.0 BLOOD PRESSURE ELEVATED WITHOUT HISTORY OF HTN: ICD-10-CM

## 2024-02-14 RX ORDER — LOSARTAN POTASSIUM 50 MG/1
50 TABLET ORAL DAILY
Qty: 90 TABLET | Refills: 0 | Status: SHIPPED | OUTPATIENT
Start: 2024-02-14 | End: 2024-04-25

## 2024-02-23 ENCOUNTER — LAB (OUTPATIENT)
Dept: LAB | Facility: LAB | Age: 69
End: 2024-02-23
Payer: MEDICARE

## 2024-02-23 DIAGNOSIS — R35.0 URINARY FREQUENCY: ICD-10-CM

## 2024-02-23 PROCEDURE — 87086 URINE CULTURE/COLONY COUNT: CPT

## 2024-02-23 PROCEDURE — 87186 SC STD MICRODIL/AGAR DIL: CPT

## 2024-02-26 ENCOUNTER — TELEPHONE (OUTPATIENT)
Dept: PRIMARY CARE | Facility: CLINIC | Age: 69
End: 2024-02-26
Payer: MEDICARE

## 2024-02-26 RX ORDER — CIPROFLOXACIN 500 MG/1
500 TABLET ORAL 2 TIMES DAILY
Qty: 20 TABLET | Refills: 0 | Status: SHIPPED | OUTPATIENT
Start: 2024-02-26 | End: 2024-03-07

## 2024-02-27 LAB — BACTERIA UR CULT: ABNORMAL

## 2024-03-25 DIAGNOSIS — E78.00 ELEVATED LDL CHOLESTEROL LEVEL: ICD-10-CM

## 2024-03-25 RX ORDER — ATORVASTATIN CALCIUM 40 MG/1
40 TABLET, FILM COATED ORAL DAILY
Qty: 90 TABLET | Refills: 0 | Status: SHIPPED | OUTPATIENT
Start: 2024-03-25

## 2024-04-01 DIAGNOSIS — E10.59 TYPE 1 DIABETES MELLITUS WITH OTHER CIRCULATORY COMPLICATION (MULTI): ICD-10-CM

## 2024-04-01 DIAGNOSIS — E11.649 DIABETIC HYPOGLYCEMIA (MULTI): ICD-10-CM

## 2024-04-01 DIAGNOSIS — E78.00 ELEVATED LDL CHOLESTEROL LEVEL: ICD-10-CM

## 2024-04-08 ENCOUNTER — LAB (OUTPATIENT)
Dept: LAB | Facility: LAB | Age: 69
End: 2024-04-08
Payer: MEDICARE

## 2024-04-08 DIAGNOSIS — E55.9 VITAMIN D DEFICIENCY: ICD-10-CM

## 2024-04-08 DIAGNOSIS — E11.649 DIABETIC HYPOGLYCEMIA (MULTI): ICD-10-CM

## 2024-04-08 DIAGNOSIS — E10.59 TYPE 1 DIABETES MELLITUS WITH OTHER CIRCULATORY COMPLICATION (MULTI): ICD-10-CM

## 2024-04-08 DIAGNOSIS — E78.00 ELEVATED LDL CHOLESTEROL LEVEL: ICD-10-CM

## 2024-04-08 DIAGNOSIS — E10.9 TYPE 1 DIABETES MELLITUS WITHOUT COMPLICATION (MULTI): ICD-10-CM

## 2024-04-08 LAB
25(OH)D3 SERPL-MCNC: 30 NG/ML (ref 30–100)
ALBUMIN SERPL BCP-MCNC: 4.3 G/DL (ref 3.4–5)
ALP SERPL-CCNC: 51 U/L (ref 33–136)
ALT SERPL W P-5'-P-CCNC: 39 U/L (ref 7–45)
ANION GAP SERPL CALC-SCNC: 12 MMOL/L (ref 10–20)
AST SERPL W P-5'-P-CCNC: 33 U/L (ref 9–39)
BASOPHILS # BLD AUTO: 0.06 X10*3/UL (ref 0–0.1)
BASOPHILS NFR BLD AUTO: 1.1 %
BILIRUB SERPL-MCNC: 0.3 MG/DL (ref 0–1.2)
BUN SERPL-MCNC: 14 MG/DL (ref 6–23)
CALCIUM SERPL-MCNC: 9.5 MG/DL (ref 8.6–10.6)
CHLORIDE SERPL-SCNC: 98 MMOL/L (ref 98–107)
CHOLEST SERPL-MCNC: 151 MG/DL (ref 0–199)
CHOLESTEROL/HDL RATIO: 2.3
CO2 SERPL-SCNC: 29 MMOL/L (ref 21–32)
CREAT SERPL-MCNC: 0.84 MG/DL (ref 0.5–1.05)
EGFRCR SERPLBLD CKD-EPI 2021: 76 ML/MIN/1.73M*2
EOSINOPHIL # BLD AUTO: 0.12 X10*3/UL (ref 0–0.7)
EOSINOPHIL NFR BLD AUTO: 2.2 %
ERYTHROCYTE [DISTWIDTH] IN BLOOD BY AUTOMATED COUNT: 13 % (ref 11.5–14.5)
EST. AVERAGE GLUCOSE BLD GHB EST-MCNC: 166 MG/DL
GLUCOSE SERPL-MCNC: 137 MG/DL (ref 74–99)
HBA1C MFR BLD: 7.4 %
HCT VFR BLD AUTO: 36.6 % (ref 36–46)
HDLC SERPL-MCNC: 65.9 MG/DL
HGB BLD-MCNC: 11.8 G/DL (ref 12–16)
IMM GRANULOCYTES # BLD AUTO: 0.01 X10*3/UL (ref 0–0.7)
IMM GRANULOCYTES NFR BLD AUTO: 0.2 % (ref 0–0.9)
LDLC SERPL CALC-MCNC: 73 MG/DL
LYMPHOCYTES # BLD AUTO: 1.67 X10*3/UL (ref 1.2–4.8)
LYMPHOCYTES NFR BLD AUTO: 30.1 %
MCH RBC QN AUTO: 29.8 PG (ref 26–34)
MCHC RBC AUTO-ENTMCNC: 32.2 G/DL (ref 32–36)
MCV RBC AUTO: 92 FL (ref 80–100)
MONOCYTES # BLD AUTO: 0.45 X10*3/UL (ref 0.1–1)
MONOCYTES NFR BLD AUTO: 8.1 %
NEUTROPHILS # BLD AUTO: 3.24 X10*3/UL (ref 1.2–7.7)
NEUTROPHILS NFR BLD AUTO: 58.3 %
NON HDL CHOLESTEROL: 85 MG/DL (ref 0–149)
NRBC BLD-RTO: 0 /100 WBCS (ref 0–0)
PLATELET # BLD AUTO: 264 X10*3/UL (ref 150–450)
POTASSIUM SERPL-SCNC: 4.8 MMOL/L (ref 3.5–5.3)
PROT SERPL-MCNC: 6.5 G/DL (ref 6.4–8.2)
RBC # BLD AUTO: 3.96 X10*6/UL (ref 4–5.2)
SODIUM SERPL-SCNC: 134 MMOL/L (ref 136–145)
TRIGL SERPL-MCNC: 60 MG/DL (ref 0–149)
TSH SERPL-ACNC: 2 MIU/L (ref 0.44–3.98)
VLDL: 12 MG/DL (ref 0–40)
WBC # BLD AUTO: 5.6 X10*3/UL (ref 4.4–11.3)

## 2024-04-08 PROCEDURE — 80053 COMPREHEN METABOLIC PANEL: CPT

## 2024-04-08 PROCEDURE — 84443 ASSAY THYROID STIM HORMONE: CPT

## 2024-04-08 PROCEDURE — 85025 COMPLETE CBC W/AUTO DIFF WBC: CPT

## 2024-04-08 PROCEDURE — 83036 HEMOGLOBIN GLYCOSYLATED A1C: CPT

## 2024-04-08 PROCEDURE — 82306 VITAMIN D 25 HYDROXY: CPT

## 2024-04-08 PROCEDURE — 36415 COLL VENOUS BLD VENIPUNCTURE: CPT

## 2024-04-08 PROCEDURE — 80061 LIPID PANEL: CPT

## 2024-04-09 NOTE — RESULT ENCOUNTER NOTE
Please call and inform pt that all labs are stable -continue medications as directed and also continue low fat sugar diet exercise multivitamin and vitamin D daily   Make sure you are watching and/or cutting back on carbs such as bread, pasta, rice, potato, butters and fats, fried foods, red meats while working on a low carb/low fat diet. Cut back on sweets and sugar containing products. NO FAST FOOD. Water should be the main beverage. Cut back on alcohol and/or tobacco if you are using. Review the Mediterranean diet online. Also incorporate 30-50 consecutive minutes of physical activity/exercise daily. We will continue to monitor your cholesterol.  Call office with any further questions or concerns and follow up withPCP as directed

## 2024-04-15 ENCOUNTER — OFFICE VISIT (OUTPATIENT)
Dept: ENDOCRINOLOGY | Facility: CLINIC | Age: 69
End: 2024-04-15
Payer: MEDICARE

## 2024-04-15 ENCOUNTER — APPOINTMENT (OUTPATIENT)
Dept: PRIMARY CARE | Facility: CLINIC | Age: 69
End: 2024-04-15
Payer: MEDICARE

## 2024-04-15 VITALS
DIASTOLIC BLOOD PRESSURE: 64 MMHG | HEART RATE: 70 BPM | RESPIRATION RATE: 16 BRPM | HEIGHT: 66 IN | BODY MASS INDEX: 22.76 KG/M2 | SYSTOLIC BLOOD PRESSURE: 130 MMHG

## 2024-04-15 DIAGNOSIS — E78.5 HYPERLIPIDEMIA, UNSPECIFIED HYPERLIPIDEMIA TYPE: ICD-10-CM

## 2024-04-15 DIAGNOSIS — E10.9 TYPE 1 DIABETES MELLITUS WITHOUT COMPLICATION (MULTI): Primary | ICD-10-CM

## 2024-04-15 DIAGNOSIS — M81.0 OSTEOPOROSIS, UNSPECIFIED OSTEOPOROSIS TYPE, UNSPECIFIED PATHOLOGICAL FRACTURE PRESENCE: ICD-10-CM

## 2024-04-15 DIAGNOSIS — I10 HYPERTENSION, UNSPECIFIED TYPE: ICD-10-CM

## 2024-04-15 PROCEDURE — 1160F RVW MEDS BY RX/DR IN RCRD: CPT | Performed by: INTERNAL MEDICINE

## 2024-04-15 PROCEDURE — 3051F HG A1C>EQUAL 7.0%<8.0%: CPT | Performed by: INTERNAL MEDICINE

## 2024-04-15 PROCEDURE — 3048F LDL-C <100 MG/DL: CPT | Performed by: INTERNAL MEDICINE

## 2024-04-15 PROCEDURE — 4010F ACE/ARB THERAPY RXD/TAKEN: CPT | Performed by: INTERNAL MEDICINE

## 2024-04-15 PROCEDURE — 3075F SYST BP GE 130 - 139MM HG: CPT | Performed by: INTERNAL MEDICINE

## 2024-04-15 PROCEDURE — 1157F ADVNC CARE PLAN IN RCRD: CPT | Performed by: INTERNAL MEDICINE

## 2024-04-15 PROCEDURE — 1159F MED LIST DOCD IN RCRD: CPT | Performed by: INTERNAL MEDICINE

## 2024-04-15 PROCEDURE — 99214 OFFICE O/P EST MOD 30 MIN: CPT | Performed by: INTERNAL MEDICINE

## 2024-04-15 PROCEDURE — 3078F DIAST BP <80 MM HG: CPT | Performed by: INTERNAL MEDICINE

## 2024-04-15 PROCEDURE — 1036F TOBACCO NON-USER: CPT | Performed by: INTERNAL MEDICINE

## 2024-04-15 RX ORDER — AMOXICILLIN AND CLAVULANATE POTASSIUM 875; 125 MG/1; MG/1
TABLET, FILM COATED ORAL
COMMUNITY
Start: 2024-04-01 | End: 2024-04-15 | Stop reason: ALTCHOICE

## 2024-04-15 RX ORDER — PEN NEEDLE, DIABETIC 30 GX3/16"
NEEDLE, DISPOSABLE MISCELLANEOUS
Qty: 500 EACH | Refills: 3 | Status: SHIPPED | OUTPATIENT
Start: 2024-04-15

## 2024-04-15 ASSESSMENT — ENCOUNTER SYMPTOMS
FATIGUE: 0
NAUSEA: 0
HEADACHES: 0
CHILLS: 0
FEVER: 0
VOMITING: 0
PALPITATIONS: 0
COUGH: 0
SHORTNESS OF BREATH: 0
DIARRHEA: 0

## 2024-04-15 NOTE — PROGRESS NOTES
Endocrinology: Follow up visit  Subjective   Patient ID: Natacha Rangel is a 68 y.o. female who presents for Diabetes (Type 1 ), Hypothyroidism, Hyperlipidemia, and Hypertension.    PCP: Godwin Londono MD    HPI  Since last visit sugars are doing great.   Was hoping to get A1c down a bit lower but had recent uti and poor rxn to covid vax.   Kimberly data reviewed and sugars look ok,  no serious lows.  Asking about cutting back shots by switching lantus to mounjaro and continuing ozempic    Checks sugars 4x a day  Injects insulin 5x a day  Currently utilizing cgm to check sugars      Review of Systems   Constitutional:  Negative for chills, fatigue and fever.   Respiratory:  Negative for cough and shortness of breath.    Cardiovascular:  Negative for chest pain and palpitations.   Gastrointestinal:  Negative for diarrhea, nausea and vomiting.   Neurological:  Negative for headaches.       Patient Active Problem List   Diagnosis    Acute right ankle pain    Anisocoria    Anxiety disorder    Basal cell carcinoma of skin of other part of trunk    Blood pressure elevated without history of HTN    Bone mass    Osteoporosis    Cellulitis of right thumb    Chest wall mass    Diabetic hypoglycemia (Multi)    Hypoglycemia    Elevated LDL cholesterol level    Follicular lymphoma (Multi)    Ganglion cyst    Generalized lymphadenopathy    Headache    History of breast cancer    Hyperglycemia    Hypertension    PVD (peripheral vascular disease) (CMS-HCC)    Drug reaction    Immunization reaction    Infection of thumb    Itch of skin    Scar condition and fibrosis of skin    Melanocytic nevi of trunk    Melanoma of face (Multi)    Metastatic malignant neoplasm (Multi)    Neoplasm of uncertain behavior of skin    Other melanin hyperpigmentation    Other seborrheic keratosis    Panaritium of finger    Paronychia of finger of right hand    Personal history of malignant melanoma of skin    Pleural effusion    Pure  "hypercholesterolemia    Right foot pain    Seasonal allergies    Sprain of right ankle    Syncope and collapse    Tension headache    Thumb pain, right    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 (Multi)    Type 1 diabetes mellitus (Multi)    Urinary frequency    Vitamin D deficiency        Home Meds:  Current Outpatient Medications   Medication Instructions    atorvastatin (LIPITOR) 40 mg, oral, Daily    cyanocobalamin (Vitamin B-12) 1,000 mcg tablet oral    FreeStyle Kimberly 2 Crocker misc USE CONTINUOUSLY TO MONITOR BLOOD SUGARS DAILY    FreeStyle Kimberly sensor system (FreeStyle Kimberly 2 Sensor) kit apply 1 SENSOR to back OF UPPER ARM REMOVE AND REPLACE every 14 days    HumaLOG KwikPen Insulin 100 unit/mL injection inject up to 30 units daily as directed per sliding scale    ibandronate (Boniva) 150 mg tablet take 1 tablet by mouth every month ON THE SAME DATE WITH FULL GLA...  (REFER TO PRESCRIPTION NOTES).    Lantus Solostar U-100 Insulin 30 Units, subcutaneous, Nightly    losartan (COZAAR) 50 mg, oral, Daily    OneTouch Ultra Test strip test every 2 hours dx:E10.9,c79.9    Ozempic 0.5 mg, subcutaneous, Once Weekly    pen needle, diabetic (Droplet Pen Needle) 32 gauge x 5/32\" needle use with INSULIN five times a day    valACYclovir (VALTREX) 1,000 mg, oral, Daily        No Known Allergies     Objective   Vitals:    04/15/24 1346   BP: 130/64   Pulse: 70   Resp: 16      Vitals:      Body mass index is 22.76 kg/m².   Physical Exam  Constitutional:       Appearance: Normal appearance. She is normal weight.   HENT:      Head: Normocephalic and atraumatic.   Neck:      Thyroid: No thyroid mass, thyromegaly or thyroid tenderness.   Cardiovascular:      Rate and Rhythm: Normal rate and regular rhythm.      Heart sounds: No murmur heard.     No gallop.   Pulmonary:      Effort: Pulmonary effort is normal.      Breath sounds: Normal breath sounds.   Abdominal:      " "Palpations: Abdomen is soft.      Comments: benign   Neurological:      General: No focal deficit present.      Mental Status: She is alert and oriented to person, place, and time.      Deep Tendon Reflexes: Reflexes are normal and symmetric.   Psychiatric:         Behavior: Behavior is cooperative.         Labs:  Lab Results   Component Value Date    HGBA1C 7.4 (H) 04/08/2024    TSH 2.00 04/08/2024    FREET4 0.90 12/04/2023      Lab Results   Component Value Date    PR1 AMRIT 03/14/2018        Assessment/Plan   Problem List Items Addressed This Visit       Osteoporosis    Hypertension    Type 1 diabetes mellitus (Multi) - Primary    Relevant Medications    pen needle, diabetic (Droplet Pen Needle) 32 gauge x 5/32\" needle     Other Visit Diagnoses       Hyperlipidemia, unspecified hyperlipidemia type              Dm1:  Discussed course. She cannot stop lantus and sub for mounjaro.   Mounjaro is in same class as ozempic  We did go over tresiba as a substitute for lantus to allow every day dosing  Lipids:  Controlled  Osteo:  Continue boniva    Electronically signed by:  Vielka Arevalo MD 04/15/24 2:24 PM              "

## 2024-04-22 ENCOUNTER — OFFICE VISIT (OUTPATIENT)
Dept: PRIMARY CARE | Facility: CLINIC | Age: 69
End: 2024-04-22
Payer: MEDICARE

## 2024-04-22 VITALS
HEART RATE: 79 BPM | HEIGHT: 66 IN | DIASTOLIC BLOOD PRESSURE: 70 MMHG | RESPIRATION RATE: 16 BRPM | BODY MASS INDEX: 22.66 KG/M2 | OXYGEN SATURATION: 98 % | WEIGHT: 141 LBS | SYSTOLIC BLOOD PRESSURE: 120 MMHG

## 2024-04-22 DIAGNOSIS — I10 PRIMARY HYPERTENSION: ICD-10-CM

## 2024-04-22 DIAGNOSIS — C82.32: ICD-10-CM

## 2024-04-22 DIAGNOSIS — E10.3299 TYPE 1 DIABETES MELLITUS WITH MILD NONPROLIFERATIVE RETINOPATHY WITHOUT MACULAR EDEMA, UNSPECIFIED LATERALITY (MULTI): ICD-10-CM

## 2024-04-22 DIAGNOSIS — E03.9 ACQUIRED HYPOTHYROIDISM: ICD-10-CM

## 2024-04-22 DIAGNOSIS — E11.65 UNCONTROLLED TYPE 2 DIABETES MELLITUS WITH HYPERGLYCEMIA (MULTI): ICD-10-CM

## 2024-04-22 DIAGNOSIS — L29.9 GENERALIZED PRURITUS: ICD-10-CM

## 2024-04-22 DIAGNOSIS — E78.00 ELEVATED LDL CHOLESTEROL LEVEL: Primary | ICD-10-CM

## 2024-04-22 PROBLEM — M25.473 ANKLE EDEMA: Status: ACTIVE | Noted: 2024-04-22

## 2024-04-22 PROCEDURE — 3051F HG A1C>EQUAL 7.0%<8.0%: CPT | Performed by: INTERNAL MEDICINE

## 2024-04-22 PROCEDURE — 1160F RVW MEDS BY RX/DR IN RCRD: CPT | Performed by: INTERNAL MEDICINE

## 2024-04-22 PROCEDURE — 3078F DIAST BP <80 MM HG: CPT | Performed by: INTERNAL MEDICINE

## 2024-04-22 PROCEDURE — 1036F TOBACCO NON-USER: CPT | Performed by: INTERNAL MEDICINE

## 2024-04-22 PROCEDURE — 99214 OFFICE O/P EST MOD 30 MIN: CPT | Performed by: INTERNAL MEDICINE

## 2024-04-22 PROCEDURE — 3074F SYST BP LT 130 MM HG: CPT | Performed by: INTERNAL MEDICINE

## 2024-04-22 PROCEDURE — 1126F AMNT PAIN NOTED NONE PRSNT: CPT | Performed by: INTERNAL MEDICINE

## 2024-04-22 PROCEDURE — 3048F LDL-C <100 MG/DL: CPT | Performed by: INTERNAL MEDICINE

## 2024-04-22 PROCEDURE — 1157F ADVNC CARE PLAN IN RCRD: CPT | Performed by: INTERNAL MEDICINE

## 2024-04-22 PROCEDURE — 4010F ACE/ARB THERAPY RXD/TAKEN: CPT | Performed by: INTERNAL MEDICINE

## 2024-04-22 PROCEDURE — 1159F MED LIST DOCD IN RCRD: CPT | Performed by: INTERNAL MEDICINE

## 2024-04-22 ASSESSMENT — ENCOUNTER SYMPTOMS
HEMATOLOGIC/LYMPHATIC NEGATIVE: 1
GASTROINTESTINAL NEGATIVE: 1
OCCASIONAL FEELINGS OF UNSTEADINESS: 0
NEUROLOGICAL NEGATIVE: 1
CARDIOVASCULAR NEGATIVE: 1
MUSCULOSKELETAL NEGATIVE: 1
DEPRESSION: 0
ENDOCRINE NEGATIVE: 1
EYES NEGATIVE: 1
LOSS OF SENSATION IN FEET: 0
CONSTITUTIONAL NEGATIVE: 1
RESPIRATORY NEGATIVE: 1
PSYCHIATRIC NEGATIVE: 1
ALLERGIC/IMMUNOLOGIC NEGATIVE: 1

## 2024-04-22 ASSESSMENT — PATIENT HEALTH QUESTIONNAIRE - PHQ9
1. LITTLE INTEREST OR PLEASURE IN DOING THINGS: NOT AT ALL
2. FEELING DOWN, DEPRESSED OR HOPELESS: NOT AT ALL
SUM OF ALL RESPONSES TO PHQ9 QUESTIONS 1 AND 2: 0

## 2024-04-22 ASSESSMENT — PAIN SCALES - GENERAL: PAINLEVEL: 0-NO PAIN

## 2024-04-22 NOTE — ASSESSMENT & PLAN NOTE
DM - NIDDM  . Reviewed with patient / Will check  HbA1c and fasting blood sugars. Educate home self monitoring and diary keeping(reviewed with patient home blood sugar levels /diary). Educate extensively low calorie diet and weight loss with exercise. Reviewed BS- diary and Rx. Regimen. [place] renal protection with ARB/ACEI.               Educate compliance with diet and Rx. And educate risks and autcomes.      Refill insulin and Ozempic

## 2024-04-22 NOTE — ASSESSMENT & PLAN NOTE
HTN - hypertension well/controlled .Target BP < 130/80  achieved. Educate low salt diet and exercise with weight loss. Educate home self monitoring and diary keeping. Educate risks of elevate blood pressure and benefits of prompt treatment.  Refill losartan

## 2024-04-22 NOTE — PROGRESS NOTES
"Subjective   Patient ID: Natacha Rangel is a 68 y.o. female who presents for Follow-up (3 month follow nup).    HPI     Review of Systems   Constitutional: Negative.    HENT: Negative.     Eyes: Negative.    Respiratory: Negative.     Cardiovascular: Negative.    Gastrointestinal: Negative.    Endocrine: Negative.    Musculoskeletal: Negative.    Skin: Negative.    Allergic/Immunologic: Negative.    Neurological: Negative.    Hematological: Negative.    Psychiatric/Behavioral: Negative.     All other systems reviewed and are negative.      Objective   Pulse 79   Resp 16   Ht 1.676 m (5' 6\")   Wt 64 kg (141 lb)   SpO2 98%   BMI 22.76 kg/m²   Blood pressure 120/70, pulse 79, resp. rate 16, height 1.676 m (5' 6\"), weight 64 kg (141 lb), SpO2 98%.   Physical Exam  Vitals and nursing note reviewed.   Constitutional:       Appearance: Normal appearance.   HENT:      Head: Normocephalic and atraumatic.      Right Ear: Tympanic membrane, ear canal and external ear normal.      Left Ear: Tympanic membrane, ear canal and external ear normal. There is no impacted cerumen.      Nose: Nose normal.      Mouth/Throat:      Mouth: Mucous membranes are moist.      Pharynx: Oropharynx is clear.   Eyes:      Extraocular Movements: Extraocular movements intact.      Conjunctiva/sclera: Conjunctivae normal.      Pupils: Pupils are equal, round, and reactive to light.   Cardiovascular:      Rate and Rhythm: Normal rate and regular rhythm.      Pulses: Normal pulses.      Heart sounds: Normal heart sounds. No murmur heard.  Pulmonary:      Effort: Pulmonary effort is normal. No respiratory distress.      Breath sounds: Normal breath sounds. No stridor. No wheezing, rhonchi or rales.   Chest:      Chest wall: No tenderness.   Abdominal:      General: Abdomen is flat. Bowel sounds are normal. There is no distension.      Palpations: Abdomen is soft. There is no mass.      Tenderness: There is no abdominal tenderness. There is no right " CVA tenderness, left CVA tenderness, guarding or rebound.      Hernia: No hernia is present.   Musculoskeletal:         General: Normal range of motion.      Cervical back: Normal range of motion and neck supple.   Skin:     General: Skin is warm.      Capillary Refill: Capillary refill takes less than 2 seconds.   Neurological:      General: No focal deficit present.      Mental Status: She is alert.      Cranial Nerves: No cranial nerve deficit.      Sensory: No sensory deficit.      Motor: No weakness.      Coordination: Coordination normal.      Gait: Gait normal.      Deep Tendon Reflexes: Reflexes normal.   Psychiatric:         Mood and Affect: Mood normal.         Behavior: Behavior normal. Behavior is cooperative.         Thought Content: Thought content normal.         Judgment: Judgment normal.         Assessment/Plan   Problem List Items Addressed This Visit             ICD-10-CM    Elevated LDL cholesterol level - Primary E78.00     Hypercholesterolemia - Monitor lipid profile and educate patient upon risks of high cholesterol and targets. Educate diet and change in lifestyle and increase in exercises - Refill: Atorvastatin     and educate compliance with medication and diet.           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 Educate low salt diet and exercise with weight loss. Educate home self monitoring and diary keeping. Educate risks of elevate blood pressure and benefits of prompt treatment.  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 Reviewed with patient / Will check  HbA1c and fasting blood sugars. Educate home self monitoring and diary keeping(reviewed with patient home blood sugar levels /diary). Educate extensively low  calorie diet and weight loss with exercise. Reviewed BS- diary and Rx. Regimen. [place] renal protection with ARB/ACEI.               Educate compliance with diet and Rx. And educate risks and autcomes.      Refill insulin and Ozempic                                                 Anxiety disorder F41.9        Anxiety Disorder - +/- Depresion.  the patient extensively.                                                                          Elevated LDL cholesterol level E78.00       Hypercholesterolemia - Monitor lipid profile and educate patient upon risks of high cholesterol and targets. Educate diet and change in lifestyle and increase in exercises - Refill: Atorvastatin     and educate compliance with medication and diet.             Follicular lymphoma (CMS/[facility]) C82.90       Follows with oncology and monitor CBC and stable at this point           Hypertension - Primary I10       HTN - hypertension well/controlled .Target BP < 130/80  achieved. Educate low salt diet and exercise with weight loss. Educate home self monitoring and diary keeping. Educate risks of elevate blood pressure and benefits of prompt treatment.            Type 1 diabetes mellitus (CMS/[facility]) E10.9       DM - IDDM  . Reviewed with patient / Will check  HbA1c at 7.3% in December 2023 and fasting blood sugars. Educate home self monitoring and diary keeping(reviewed with patient home blood sugar levels /diary). Educate extensively low calorie diet and weight loss with exercise. Reviewed BS- diary and Rx. Regimen. [place] renal protection with ARB/ACEI.               Educate compliance with diet and Rx. And educate risks and autcomes.   Refill Ozempic and Insulin and doing much batter                                                     CHOL. - Hypercholesterolemia. Check lipid profile and LFT's. Target LDL at < 100 mg/dl. Educate low cholesterol diet (low calorie and low fat). Educate weight loss and exercise. Educate weight self  monitoring. Educate risks and outcomes. Refill: Pravastatin 40 mg daily.      HTN - hypertension well/poorly controlled.Target BP < 130/80 well/not achieved. Educate low salt diet and exercise with weight loss. Restart ARB/ Losartan Educate home self monitoring and diary keeping.   Anxiety Disorder - +/- Depresion.  the patient extensively. refer to Psychologist.      Lymphoma - Follicular - status post chemotherapy and follows with hematology oncology - feels very well and recovered and now she is cancer free -      DM - IDDM - Hyperglycemia improved - Will review the HbA1c at 8.0 % on 03/27/2023 and fasting blood sugars. Educate home self monitoring and diary keeping. Educate extensively low calorie diet and weight loss with exercise. Reviewed BS- diary and Rx. Regimen. [place] renal protection with ARB/ACEI. Refill: Insulin , Educate compliance with diet and Rx. And educate risks and outcomes.      Left ankle edema and swelling - recommend: X-rays and Ultrasound of the leg and rule out DVT - and circulation problems - recommend: leg elevation and compression stockings -   Leg edema and swelling - due to Venous Insufficiency. Improved and feels well - now - Recommend Compression stockings at 18-30 mmHg( and elevation of legs. Avoid standing still.Recommend walking and increase in exercise activity and weight loss. Also rule out sleep apnea and nocturnal hypoxia with overnight oximetry.Avoid sitting with legs down for along period of time (more than 30 to 40 minutes at the time). Recommend low salt diet -            High complexity visit of 30 minutes face-to-face with at least half of the time discussing my clinical findings, diagnosis and results of the blood work. The patient demonstrated good understanding of instructions and plans.      Risk Factors Identified During Visit: None.   Influenza: influenza vaccine was previously given.   Pneumovax 23/Prevnar 15: Pneumovax 23/Prevnar 15 vaccine was  ordered.   Prevnar 20: Prevnar 20 vaccine was ordered.   Shingles Vaccine: Shingles vaccine was ordered.   Breast cancer screening: screening is current.   Cervical cancer screening: screening is current.   Osteoporosis screening: screening is current.   Colorectal cancer screening: screening ordered~and~an not fast - we will get a cologuard -.   HIV screening: screening not indicated.                Immunizations/Injections      very important  Immunizations from outside sources need reconciliation.      Pfizer COVID-19 vaccine, Fall 2023, 12 years and older, (30mcg/0.3mL)10/22/2023  Pfizer Purple Cap SARS-CoV-25/10/2022, 8/30/2021, 2/3/2021,  ... (1 more)

## 2024-04-25 DIAGNOSIS — R03.0 BLOOD PRESSURE ELEVATED WITHOUT HISTORY OF HTN: ICD-10-CM

## 2024-04-25 RX ORDER — LOSARTAN POTASSIUM 50 MG/1
50 TABLET ORAL DAILY
Qty: 90 TABLET | Refills: 0 | Status: SHIPPED | OUTPATIENT
Start: 2024-04-25

## 2024-06-21 ENCOUNTER — TELEPHONE (OUTPATIENT)
Dept: PRIMARY CARE | Facility: CLINIC | Age: 69
End: 2024-06-21
Payer: MEDICARE

## 2024-06-21 ENCOUNTER — LAB (OUTPATIENT)
Dept: LAB | Facility: LAB | Age: 69
End: 2024-06-21
Payer: MEDICARE

## 2024-06-21 DIAGNOSIS — N39.0 URINARY TRACT INFECTION WITHOUT HEMATURIA, SITE UNSPECIFIED: ICD-10-CM

## 2024-06-21 PROCEDURE — 81001 URINALYSIS AUTO W/SCOPE: CPT

## 2024-06-21 PROCEDURE — 87186 SC STD MICRODIL/AGAR DIL: CPT

## 2024-06-21 PROCEDURE — 87086 URINE CULTURE/COLONY COUNT: CPT

## 2024-06-21 RX ORDER — CIPROFLOXACIN 500 MG/1
500 TABLET ORAL 2 TIMES DAILY
Qty: 10 TABLET | Refills: 0 | Status: SHIPPED | OUTPATIENT
Start: 2024-06-21 | End: 2024-06-26

## 2024-06-22 LAB
APPEARANCE UR: ABNORMAL
BILIRUB UR STRIP.AUTO-MCNC: NEGATIVE MG/DL
COLOR UR: YELLOW
GLUCOSE UR STRIP.AUTO-MCNC: NORMAL MG/DL
KETONES UR STRIP.AUTO-MCNC: NEGATIVE MG/DL
LEUKOCYTE ESTERASE UR QL STRIP.AUTO: ABNORMAL
NITRITE UR QL STRIP.AUTO: NEGATIVE
PH UR STRIP.AUTO: 5.5 [PH]
PROT UR STRIP.AUTO-MCNC: ABNORMAL MG/DL
RBC # UR STRIP.AUTO: ABNORMAL /UL
RBC #/AREA URNS AUTO: ABNORMAL /HPF
SP GR UR STRIP.AUTO: 1.01
UROBILINOGEN UR STRIP.AUTO-MCNC: NORMAL MG/DL
WBC #/AREA URNS AUTO: >50 /HPF
WBC CLUMPS #/AREA URNS AUTO: ABNORMAL /HPF

## 2024-06-23 LAB — BACTERIA UR CULT: ABNORMAL

## 2024-06-24 LAB — BACTERIA UR CULT: ABNORMAL

## 2024-08-12 ENCOUNTER — LAB (OUTPATIENT)
Dept: LAB | Facility: LAB | Age: 69
End: 2024-08-12
Payer: MEDICARE

## 2024-08-12 DIAGNOSIS — E10.3299 TYPE 1 DIABETES MELLITUS WITH MILD NONPROLIFERATIVE RETINOPATHY WITHOUT MACULAR EDEMA, UNSPECIFIED LATERALITY (MULTI): ICD-10-CM

## 2024-08-12 DIAGNOSIS — E78.00 ELEVATED LDL CHOLESTEROL LEVEL: ICD-10-CM

## 2024-08-12 DIAGNOSIS — E03.9 ACQUIRED HYPOTHYROIDISM: ICD-10-CM

## 2024-08-12 DIAGNOSIS — C82.32: ICD-10-CM

## 2024-08-12 DIAGNOSIS — I10 PRIMARY HYPERTENSION: ICD-10-CM

## 2024-08-12 DIAGNOSIS — E11.65 UNCONTROLLED TYPE 2 DIABETES MELLITUS WITH HYPERGLYCEMIA (MULTI): ICD-10-CM

## 2024-08-12 LAB
ALBUMIN SERPL BCP-MCNC: 4.7 G/DL (ref 3.4–5)
ALP SERPL-CCNC: 53 U/L (ref 33–136)
ALT SERPL W P-5'-P-CCNC: 30 U/L (ref 7–45)
ANION GAP SERPL CALC-SCNC: 11 MMOL/L (ref 10–20)
AST SERPL W P-5'-P-CCNC: 27 U/L (ref 9–39)
BASOPHILS # BLD AUTO: 0.08 X10*3/UL (ref 0–0.1)
BASOPHILS NFR BLD AUTO: 1.5 %
BILIRUB SERPL-MCNC: 0.7 MG/DL (ref 0–1.2)
BUN SERPL-MCNC: 15 MG/DL (ref 6–23)
CALCIUM SERPL-MCNC: 10.2 MG/DL (ref 8.6–10.6)
CHLORIDE SERPL-SCNC: 97 MMOL/L (ref 98–107)
CHOLEST SERPL-MCNC: 168 MG/DL (ref 0–199)
CHOLESTEROL/HDL RATIO: 2.2
CO2 SERPL-SCNC: 31 MMOL/L (ref 21–32)
CREAT SERPL-MCNC: 0.9 MG/DL (ref 0.5–1.05)
EGFRCR SERPLBLD CKD-EPI 2021: 70 ML/MIN/1.73M*2
EOSINOPHIL # BLD AUTO: 0.1 X10*3/UL (ref 0–0.7)
EOSINOPHIL NFR BLD AUTO: 1.9 %
ERYTHROCYTE [DISTWIDTH] IN BLOOD BY AUTOMATED COUNT: 12.7 % (ref 11.5–14.5)
EST. AVERAGE GLUCOSE BLD GHB EST-MCNC: 177 MG/DL
GLUCOSE SERPL-MCNC: 152 MG/DL (ref 74–99)
HBA1C MFR BLD: 7.8 %
HCT VFR BLD AUTO: 38.5 % (ref 36–46)
HDLC SERPL-MCNC: 76.2 MG/DL
HGB BLD-MCNC: 12.5 G/DL (ref 12–16)
IMM GRANULOCYTES # BLD AUTO: 0.02 X10*3/UL (ref 0–0.7)
IMM GRANULOCYTES NFR BLD AUTO: 0.4 % (ref 0–0.9)
LDLC SERPL CALC-MCNC: 82 MG/DL
LYMPHOCYTES # BLD AUTO: 1.7 X10*3/UL (ref 1.2–4.8)
LYMPHOCYTES NFR BLD AUTO: 32.6 %
MCH RBC QN AUTO: 30.1 PG (ref 26–34)
MCHC RBC AUTO-ENTMCNC: 32.5 G/DL (ref 32–36)
MCV RBC AUTO: 93 FL (ref 80–100)
MONOCYTES # BLD AUTO: 0.49 X10*3/UL (ref 0.1–1)
MONOCYTES NFR BLD AUTO: 9.4 %
NEUTROPHILS # BLD AUTO: 2.83 X10*3/UL (ref 1.2–7.7)
NEUTROPHILS NFR BLD AUTO: 54.2 %
NON HDL CHOLESTEROL: 92 MG/DL (ref 0–149)
NRBC BLD-RTO: 0 /100 WBCS (ref 0–0)
PLATELET # BLD AUTO: 274 X10*3/UL (ref 150–450)
POTASSIUM SERPL-SCNC: 5.3 MMOL/L (ref 3.5–5.3)
PROT SERPL-MCNC: 6.8 G/DL (ref 6.4–8.2)
RBC # BLD AUTO: 4.15 X10*6/UL (ref 4–5.2)
SODIUM SERPL-SCNC: 134 MMOL/L (ref 136–145)
TRIGL SERPL-MCNC: 51 MG/DL (ref 0–149)
TSH SERPL-ACNC: 1.84 MIU/L (ref 0.44–3.98)
VLDL: 10 MG/DL (ref 0–40)
WBC # BLD AUTO: 5.2 X10*3/UL (ref 4.4–11.3)

## 2024-08-12 PROCEDURE — 83036 HEMOGLOBIN GLYCOSYLATED A1C: CPT

## 2024-08-12 PROCEDURE — 80053 COMPREHEN METABOLIC PANEL: CPT

## 2024-08-12 PROCEDURE — 85025 COMPLETE CBC W/AUTO DIFF WBC: CPT

## 2024-08-12 PROCEDURE — 80061 LIPID PANEL: CPT

## 2024-08-12 PROCEDURE — 36415 COLL VENOUS BLD VENIPUNCTURE: CPT

## 2024-08-12 PROCEDURE — 84443 ASSAY THYROID STIM HORMONE: CPT

## 2024-08-19 ENCOUNTER — APPOINTMENT (OUTPATIENT)
Dept: ENDOCRINOLOGY | Facility: CLINIC | Age: 69
End: 2024-08-19
Payer: MEDICARE

## 2024-08-19 VITALS
RESPIRATION RATE: 16 BRPM | WEIGHT: 148.4 LBS | SYSTOLIC BLOOD PRESSURE: 118 MMHG | HEIGHT: 66 IN | DIASTOLIC BLOOD PRESSURE: 76 MMHG | HEART RATE: 80 BPM | BODY MASS INDEX: 23.85 KG/M2

## 2024-08-19 DIAGNOSIS — E10.9 TYPE 1 DIABETES MELLITUS WITHOUT COMPLICATION (MULTI): Primary | ICD-10-CM

## 2024-08-19 DIAGNOSIS — M81.0 OSTEOPOROSIS, UNSPECIFIED OSTEOPOROSIS TYPE, UNSPECIFIED PATHOLOGICAL FRACTURE PRESENCE: ICD-10-CM

## 2024-08-19 DIAGNOSIS — I10 HYPERTENSION, UNSPECIFIED TYPE: ICD-10-CM

## 2024-08-19 DIAGNOSIS — E78.5 HYPERLIPIDEMIA, UNSPECIFIED HYPERLIPIDEMIA TYPE: ICD-10-CM

## 2024-08-19 PROCEDURE — 1159F MED LIST DOCD IN RCRD: CPT | Performed by: INTERNAL MEDICINE

## 2024-08-19 PROCEDURE — 1160F RVW MEDS BY RX/DR IN RCRD: CPT | Performed by: INTERNAL MEDICINE

## 2024-08-19 PROCEDURE — 3008F BODY MASS INDEX DOCD: CPT | Performed by: INTERNAL MEDICINE

## 2024-08-19 PROCEDURE — 3078F DIAST BP <80 MM HG: CPT | Performed by: INTERNAL MEDICINE

## 2024-08-19 PROCEDURE — 1036F TOBACCO NON-USER: CPT | Performed by: INTERNAL MEDICINE

## 2024-08-19 PROCEDURE — 3074F SYST BP LT 130 MM HG: CPT | Performed by: INTERNAL MEDICINE

## 2024-08-19 PROCEDURE — 3051F HG A1C>EQUAL 7.0%<8.0%: CPT | Performed by: INTERNAL MEDICINE

## 2024-08-19 PROCEDURE — 1157F ADVNC CARE PLAN IN RCRD: CPT | Performed by: INTERNAL MEDICINE

## 2024-08-19 PROCEDURE — 4010F ACE/ARB THERAPY RXD/TAKEN: CPT | Performed by: INTERNAL MEDICINE

## 2024-08-19 PROCEDURE — 3048F LDL-C <100 MG/DL: CPT | Performed by: INTERNAL MEDICINE

## 2024-08-19 PROCEDURE — 99214 OFFICE O/P EST MOD 30 MIN: CPT | Performed by: INTERNAL MEDICINE

## 2024-08-19 RX ORDER — INSULIN LISPRO 100 [IU]/ML
INJECTION, SOLUTION INTRAVENOUS; SUBCUTANEOUS
Qty: 20 ML | Refills: 3 | Status: SHIPPED | OUTPATIENT
Start: 2024-08-19

## 2024-08-19 RX ORDER — PEN NEEDLE, DIABETIC 30 GX3/16"
NEEDLE, DISPOSABLE MISCELLANEOUS
Qty: 700 EACH | Refills: 3 | Status: SHIPPED | OUTPATIENT
Start: 2024-08-19

## 2024-08-19 RX ORDER — IBANDRONATE SODIUM 150 MG/1
TABLET, FILM COATED ORAL
Qty: 3 TABLET | Refills: 3 | Status: SHIPPED | OUTPATIENT
Start: 2024-08-19

## 2024-08-19 ASSESSMENT — ENCOUNTER SYMPTOMS
CHILLS: 0
NAUSEA: 0
FATIGUE: 0
HEADACHES: 0
VOMITING: 0
DIARRHEA: 0
PALPITATIONS: 0
COUGH: 0
SHORTNESS OF BREATH: 0
FEVER: 0

## 2024-08-19 NOTE — ASSESSMENT & PLAN NOTE
Discussed insulin mechanics and need to stabilize dosing  Discussed insulin stacking and avoiding repeat dosing at the 1-2 hr sergio as it is precipitating lows

## 2024-08-19 NOTE — PATIENT INSTRUCTIONS
Avoid stacking insulin doses after meals  Work on eating habits and activity   Follow up in 4 months

## 2024-08-19 NOTE — PROGRESS NOTES
Endocrinology: Follow up visit  Subjective   Patient ID: Natacha Rangel is a 68 y.o. female who presents for Diabetes (Type 1 ), Hypothyroidism, Hyperlipidemia, and Hypertension.    PCP: Godwin Londono MD    HPI  Dm1 on MDI and rachel  Since last visit has had several stressful events that have made her numbers more difficult to control.   A death in the family/ power outage etc.     On review of rachel data sugars are very erratic lately, she has been varying her insulin doses widely; also often taking repeat bolus about 2 hrs after eating if number is high which has precipitated lows.   Beyond stress feeling fine    Checks sugars 4x a day  Injects insulin 5-7x a day  Currently utilizing cgm to check sugars    Review of Systems   Constitutional:  Negative for chills, fatigue and fever.   Respiratory:  Negative for cough and shortness of breath.    Cardiovascular:  Negative for chest pain and palpitations.   Gastrointestinal:  Negative for diarrhea, nausea and vomiting.   Neurological:  Negative for headaches.       Patient Active Problem List   Diagnosis    Acute right ankle pain    Anisocoria    Anxiety disorder    Basal cell carcinoma of skin of other part of trunk    Blood pressure elevated without history of HTN    Bone mass    Osteoporosis    Cellulitis of right thumb    Chest wall mass    Diabetic hypoglycemia (Multi)    Hypoglycemia    Elevated LDL cholesterol level    Follicular lymphoma (Multi)    Ganglion cyst    Generalized lymphadenopathy    Headache    History of breast cancer    Hyperglycemia    Hypertension    PVD (peripheral vascular disease) (CMS-HCC)    Drug reaction    Immunization reaction    Infection of thumb    Generalized pruritus    Scar condition and fibrosis of skin    Melanocytic nevi of trunk    Melanoma of face (Multi)    Metastatic malignant neoplasm (Multi)    Neoplasm of uncertain behavior of skin    Other melanin hyperpigmentation    Other seborrheic keratosis    Panaritium of  "finger    Paronychia of finger of right hand    Personal history of malignant melanoma of skin    Pleural effusion    Pure hypercholesterolemia    Right foot pain    Seasonal allergies    Sprain of right ankle    Syncope and collapse    Tension headache    Thumb pain, right    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 (Multi)    Type 1 diabetes mellitus (Multi)    Urinary frequency    Vitamin D deficiency    Ankle edema        Home Meds:  Current Outpatient Medications   Medication Instructions    atorvastatin (LIPITOR) 40 mg, oral, Daily    cyanocobalamin (Vitamin B-12) 1,000 mcg tablet oral    FreeStyle Kimberly 2 [place] misc USE CONTINUOUSLY TO MONITOR BLOOD SUGARS DAILY    FreeStyle Kimberly sensor system (FreeStyle Kimberly 2 Sensor) kit apply 1 SENSOR to back OF UPPER ARM REMOVE AND REPLACE every 14 days    HumaLOG KwikPen Insulin 100 unit/mL injection inject up to 30 units daily as directed per sliding scale    ibandronate (Boniva) 150 mg tablet take 1 tablet by mouth every month ON THE SAME DATE WITH FULL GLA...  (REFER TO PRESCRIPTION NOTES).    Lantus Solostar U-100 Insulin 30 Units, subcutaneous, Nightly    losartan (COZAAR) 50 mg, oral, Daily    OneTouch Ultra Test strip test every 2 hours dx:E10.9,c79.9    Ozempic 0.5 mg, subcutaneous, Once Weekly    pen needle, diabetic (Droplet Pen Needle) 32 gauge x 5/32\" needle use with INSULIN five times a day    valACYclovir (VALTREX) 1,000 mg, oral, Daily        No Known Allergies     Objective   Vitals:    08/19/24 1353   BP: 118/76   Pulse: 80   Resp: 16      Vitals:    08/19/24 1353   Weight: 67.3 kg (148 lb 6.4 oz)      Body mass index is 23.95 kg/m².   Physical Exam  Constitutional:       Appearance: Normal appearance. She is normal weight.   HENT:      Head: Normocephalic and atraumatic.   Neck:      Thyroid: No thyroid mass, thyromegaly or thyroid tenderness.   Cardiovascular:      Rate and Rhythm: Normal " rate and regular rhythm.      Heart sounds: No murmur heard.     No gallop.   Pulmonary:      Effort: Pulmonary effort is normal.      Breath sounds: Normal breath sounds.   Abdominal:      Palpations: Abdomen is soft.      Comments: benign   Neurological:      General: No focal deficit present.      Mental Status: She is alert and oriented to person, place, and time.      Deep Tendon Reflexes: Reflexes are normal and symmetric.   Psychiatric:         Behavior: Behavior is cooperative.       Labs:  Lab Results   Component Value Date    HGBA1C 7.8 (H) 08/12/2024    TSH 1.84 08/12/2024    FREET4 0.90 12/04/2023      Lab Results   Component Value Date    PR1 AMRIT 03/14/2018        Assessment/Plan   Assessment & Plan  Type 1 diabetes mellitus without complication (Multi)    Discussed insulin mechanics and need to stabilize dosing  Discussed insulin stacking and avoiding repeat dosing at the 1-2 hr sergio as it is precipitating lows    Hypertension, unspecified type    Bp excellent  Hyperlipidemia, unspecified hyperlipidemia type    Stable on statin  Osteoporosis, unspecified osteoporosis type, unspecified pathological fracture presence    Stable on ibandronate; started 2022      Electronically signed by:  Vielka Arevalo MD 08/19/24 1:54 PM

## 2024-08-20 DIAGNOSIS — E78.00 ELEVATED LDL CHOLESTEROL LEVEL: ICD-10-CM

## 2024-08-20 RX ORDER — ATORVASTATIN CALCIUM 40 MG/1
40 TABLET, FILM COATED ORAL DAILY
Qty: 90 TABLET | Refills: 0 | Status: SHIPPED | OUTPATIENT
Start: 2024-08-20

## 2024-08-26 ENCOUNTER — APPOINTMENT (OUTPATIENT)
Dept: PRIMARY CARE | Facility: CLINIC | Age: 69
End: 2024-08-26
Payer: MEDICARE

## 2024-09-07 DIAGNOSIS — R03.0 BLOOD PRESSURE ELEVATED WITHOUT HISTORY OF HTN: ICD-10-CM

## 2024-09-09 ENCOUNTER — APPOINTMENT (OUTPATIENT)
Dept: PRIMARY CARE | Facility: CLINIC | Age: 69
End: 2024-09-09
Payer: MEDICARE

## 2024-09-09 VITALS
HEART RATE: 82 BPM | WEIGHT: 145 LBS | SYSTOLIC BLOOD PRESSURE: 120 MMHG | BODY MASS INDEX: 23.3 KG/M2 | DIASTOLIC BLOOD PRESSURE: 70 MMHG | HEIGHT: 66 IN | RESPIRATION RATE: 17 BRPM | OXYGEN SATURATION: 98 %

## 2024-09-09 DIAGNOSIS — E78.00 ELEVATED LDL CHOLESTEROL LEVEL: ICD-10-CM

## 2024-09-09 DIAGNOSIS — Z00.00 MEDICARE ANNUAL WELLNESS VISIT, SUBSEQUENT: Primary | ICD-10-CM

## 2024-09-09 DIAGNOSIS — F41.1 GENERALIZED ANXIETY DISORDER: ICD-10-CM

## 2024-09-09 DIAGNOSIS — R03.0 BLOOD PRESSURE ELEVATED WITHOUT HISTORY OF HTN: ICD-10-CM

## 2024-09-09 DIAGNOSIS — C82.32: ICD-10-CM

## 2024-09-09 DIAGNOSIS — Z00.00 ROUTINE GENERAL MEDICAL EXAMINATION AT HEALTH CARE FACILITY: ICD-10-CM

## 2024-09-09 DIAGNOSIS — E10.3299 TYPE 1 DIABETES MELLITUS WITH MILD NONPROLIFERATIVE RETINOPATHY WITHOUT MACULAR EDEMA, UNSPECIFIED LATERALITY (MULTI): ICD-10-CM

## 2024-09-09 PROBLEM — C79.9 METASTATIC MALIGNANT NEOPLASM (MULTI): Status: RESOLVED | Noted: 2023-12-18 | Resolved: 2024-09-09

## 2024-09-09 PROCEDURE — 1159F MED LIST DOCD IN RCRD: CPT | Performed by: INTERNAL MEDICINE

## 2024-09-09 PROCEDURE — G0439 PPPS, SUBSEQ VISIT: HCPCS | Performed by: INTERNAL MEDICINE

## 2024-09-09 PROCEDURE — 3074F SYST BP LT 130 MM HG: CPT | Performed by: INTERNAL MEDICINE

## 2024-09-09 PROCEDURE — 1036F TOBACCO NON-USER: CPT | Performed by: INTERNAL MEDICINE

## 2024-09-09 PROCEDURE — 3008F BODY MASS INDEX DOCD: CPT | Performed by: INTERNAL MEDICINE

## 2024-09-09 PROCEDURE — 1157F ADVNC CARE PLAN IN RCRD: CPT | Performed by: INTERNAL MEDICINE

## 2024-09-09 PROCEDURE — 1170F FXNL STATUS ASSESSED: CPT | Performed by: INTERNAL MEDICINE

## 2024-09-09 PROCEDURE — 3048F LDL-C <100 MG/DL: CPT | Performed by: INTERNAL MEDICINE

## 2024-09-09 PROCEDURE — 4010F ACE/ARB THERAPY RXD/TAKEN: CPT | Performed by: INTERNAL MEDICINE

## 2024-09-09 PROCEDURE — 3051F HG A1C>EQUAL 7.0%<8.0%: CPT | Performed by: INTERNAL MEDICINE

## 2024-09-09 PROCEDURE — 99214 OFFICE O/P EST MOD 30 MIN: CPT | Performed by: INTERNAL MEDICINE

## 2024-09-09 PROCEDURE — 1160F RVW MEDS BY RX/DR IN RCRD: CPT | Performed by: INTERNAL MEDICINE

## 2024-09-09 PROCEDURE — 3078F DIAST BP <80 MM HG: CPT | Performed by: INTERNAL MEDICINE

## 2024-09-09 RX ORDER — LOSARTAN POTASSIUM 50 MG/1
50 TABLET ORAL DAILY
Qty: 90 TABLET | Refills: 0 | Status: SHIPPED | OUTPATIENT
Start: 2024-09-09

## 2024-09-09 ASSESSMENT — PATIENT HEALTH QUESTIONNAIRE - PHQ9
2. FEELING DOWN, DEPRESSED OR HOPELESS: NOT AT ALL
SUM OF ALL RESPONSES TO PHQ9 QUESTIONS 1 AND 2: 0
1. LITTLE INTEREST OR PLEASURE IN DOING THINGS: NOT AT ALL

## 2024-09-09 ASSESSMENT — ENCOUNTER SYMPTOMS
HEMATOLOGIC/LYMPHATIC NEGATIVE: 1
PSYCHIATRIC NEGATIVE: 1
EYES NEGATIVE: 1
CONSTITUTIONAL NEGATIVE: 1
RESPIRATORY NEGATIVE: 1
DEPRESSION: 0
MUSCULOSKELETAL NEGATIVE: 1
CARDIOVASCULAR NEGATIVE: 1
ALLERGIC/IMMUNOLOGIC NEGATIVE: 1
GASTROINTESTINAL NEGATIVE: 1
LOSS OF SENSATION IN FEET: 0
NEUROLOGICAL NEGATIVE: 1
ENDOCRINE NEGATIVE: 1
OCCASIONAL FEELINGS OF UNSTEADINESS: 0

## 2024-09-09 ASSESSMENT — ACTIVITIES OF DAILY LIVING (ADL)
TAKING_MEDICATION: INDEPENDENT
GROCERY_SHOPPING: INDEPENDENT
DOING_HOUSEWORK: INDEPENDENT
MANAGING_FINANCES: INDEPENDENT
BATHING: INDEPENDENT
DRESSING: INDEPENDENT

## 2024-09-09 NOTE — ASSESSMENT & PLAN NOTE
DM - NIDDM  . Reviewed with patient / Will check  HbA1c and fasting blood sugars. Educate home self monitoring and diary keeping(reviewed with patient home blood sugar levels /diary). Educate extensively low calorie diet and weight loss with exercise. Reviewed BS- diary and Rx. Regimen. [place] renal protection with ARB/ACEI.               Educate compliance with diet and Rx. And educate risks and autcomes.      Refill insulin and Ozempic

## 2024-09-09 NOTE — PROGRESS NOTES
"Subjective   Patient ID: Natacha Rangel is a 68 y.o. female who presents for Medicare Annual Wellness Visit Subsequent (MCR/Follow up).    HPI     Review of Systems   Constitutional: Negative.    HENT: Negative.     Eyes: Negative.    Respiratory: Negative.     Cardiovascular: Negative.    Gastrointestinal: Negative.    Endocrine: Negative.    Musculoskeletal: Negative.    Skin: Negative.    Allergic/Immunologic: Negative.    Neurological: Negative.    Hematological: Negative.    Psychiatric/Behavioral: Negative.     All other systems reviewed and are negative.      Objective   Pulse 82   Resp 17   Ht 1.676 m (5' 6\")   Wt 65.8 kg (145 lb)   SpO2 98%   BMI 23.40 kg/m²   Blood pressure 120/70, pulse 82, resp. rate 17, height 1.676 m (5' 6\"), weight 65.8 kg (145 lb), SpO2 98%.   Physical Exam  Vitals and nursing note reviewed.   Constitutional:       Appearance: Normal appearance.   HENT:      Head: Normocephalic and atraumatic.      Right Ear: Tympanic membrane, ear canal and external ear normal.      Left Ear: Tympanic membrane, ear canal and external ear normal. There is no impacted cerumen.      Nose: Nose normal.      Mouth/Throat:      Mouth: Mucous membranes are moist.      Pharynx: Oropharynx is clear.   Eyes:      Extraocular Movements: Extraocular movements intact.      Conjunctiva/sclera: Conjunctivae normal.      Pupils: Pupils are equal, round, and reactive to light.   Cardiovascular:      Rate and Rhythm: Normal rate and regular rhythm.      Pulses: Normal pulses.      Heart sounds: Normal heart sounds. No murmur heard.  Pulmonary:      Effort: Pulmonary effort is normal. No respiratory distress.      Breath sounds: Normal breath sounds. No stridor. No wheezing, rhonchi or rales.   Chest:      Chest wall: No tenderness.   Abdominal:      General: Abdomen is flat. Bowel sounds are normal. There is no distension.      Palpations: Abdomen is soft. There is no mass.      Tenderness: There is no abdominal " tenderness. There is no right CVA tenderness, left CVA tenderness, guarding or rebound.      Hernia: No hernia is present.   Musculoskeletal:         General: Normal range of motion.      Cervical back: Normal range of motion and neck supple.   Skin:     General: Skin is warm.      Capillary Refill: Capillary refill takes less than 2 seconds.   Neurological:      General: No focal deficit present.      Mental Status: She is alert.      Cranial Nerves: No cranial nerve deficit.      Sensory: No sensory deficit.      Motor: No weakness.      Coordination: Coordination normal.      Gait: Gait normal.      Deep Tendon Reflexes: Reflexes normal.   Psychiatric:         Mood and Affect: Mood normal.         Behavior: Behavior normal. Behavior is cooperative.         Thought Content: Thought content normal.         Judgment: Judgment normal.         Assessment/Plan   Problem List Items Addressed This Visit             ICD-10-CM    Anxiety disorder F41.9     Anxiety Disorder - +/- Depresion.  the patient extensively.               Blood pressure elevated without history of HTN R03.0     HTN - hypertension well/controlled .Target BP < 130/80  achieved. Educate low salt diet and exercise with weight loss. Educate home self monitoring and diary keeping. Educate risks of elevate blood pressure and benefits of prompt treatment.  Refill Losartan         Elevated LDL cholesterol level E78.00     Hypercholesterolemia - Monitor lipid profile and educate patient upon risks of high cholesterol and targets. Educate diet and change in lifestyle and increase in exercises - Refill: Atorvastatin and educate compliance with medication and diet.          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 Reviewed with patient / Will check  HbA1c  and fasting blood sugars. Educate home self monitoring and diary keeping(reviewed with patient home blood sugar levels /diary). Educate extensively low calorie diet and weight loss with exercise. Reviewed BS- diary and Rx. Regimen. [place] renal protection with ARB/ACEI.               Educate compliance with diet and Rx. And educate risks and autcomes.      Refill insulin and Ozempic                                           Medicare annual wellness visit, subsequent - Primary Z00.00     No recent hospitalizations.    All medications reviewed and reconciled by me the provider..  No use of controlled substances or opiates.    Family history, social history reviewed, no changes.    Patient does not smoke.    Patient does not drink.    Patient hydrates adequately daily.  Eats a well-balanced healthy diet.     Exercises adequately including walking and doing weightbearing exercises.    Patient denies any difficulty with memory or cognition.     Denies any difficulty with hearing.  Patient does not wear hearing aids.    No fall risk.  No recent falls.  Denies any difficulty walking.    Patient with no history of depression anxiety, denies any loss of interest, no feeling of sadness, no lack of motivation.    Patient is independent in all ADLs and IADLs.  Independent bathing, dressing, walking.  Takes care of own finances, shopping and cooking.     End-of-life decision-making power of  reviewed with patient.     Risk Factors Identified During Visit: None.   Influenza: influenza vaccine was previously given.   Pneumovax 23: Pneumovax 23 vaccine was previously given.   Prevnar 13: Prevnar 13 vaccine was previously given.   Shingles Vaccine: Shingles vaccine was previously given.     Colorectal Cancer Screening: screening is current.   Abdominal Aortic Aneurysm screening: screening is current.   HIV screening: screening not indicated.       Preventive measures - Recommend ASAP : PAP/Mamogram and refer patient to  GYN. Specialist. Colonoscopy (educate patient risks of colon cancer) refer patient to GI specialist. Ophthalmology and retina exam recommend yearly exams refer patient to an Ophthalmologist.             Elevated LDL cholesterol level - Primary E78.00        Hypercholesterolemia - Monitor lipid profile and educate patient upon risks of high cholesterol and targets. Educate diet and change in lifestyle and increase in exercises - Refill: Atorvastatin     and educate compliance with medication and diet.             Follicular lymphoma (Multi) C82.90       Follows with oncology and monitor CBC and stable at this point            Hypertension I10       HTN - hypertension well/controlled .Target BP < 130/80  achieved. Educate low salt diet and exercise with weight loss. Educate home self monitoring and diary keeping. Educate risks of elevate blood pressure and benefits of prompt treatment.  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 Reviewed with patient / Will check  HbA1c and fasting blood sugars. Educate home self monitoring and diary keeping(reviewed with patient home blood sugar levels /diary). Educate extensively low calorie diet and weight loss with exercise. Reviewed BS- diary and Rx. Regimen. [place] renal protection with ARB/ACEI.               Educate compliance with diet and Rx. And educate risks and autcomes.      Refill insulin and Ozempic                                                        Anxiety disorder F41.9          Anxiety Disorder - +/- Depresion.  the patient extensively.                                                                          Elevated LDL cholesterol level E78.00        Hypercholesterolemia - Monitor lipid profile and educate patient upon risks of high cholesterol and targets. Educate diet and change in lifestyle and increase in exercises - Refill:  Atorvastatin     and educate compliance with medication and diet.             Follicular lymphoma (CMS/HCC) C82.90        Follows with oncology and monitor CBC and stable at this point           Hypertension - Primary I10        HTN - hypertension well/controlled .Target BP < 130/80  achieved. Educate low salt diet and exercise with weight loss. Educate home self monitoring and diary keeping. Educate risks of elevate blood pressure and benefits of prompt treatment.            Type 1 diabetes mellitus (CMS/[facility]) E10.9        DM - IDDM  . Reviewed with patient / Will check  HbA1c at 7.3% in December 2023 and fasting blood sugars. Educate home self monitoring and diary keeping(reviewed with patient home blood sugar levels /diary). Educate extensively low calorie diet and weight loss with exercise. Reviewed BS- diary and Rx. Regimen. [place] renal protection with ARB/ACEI.               Educate compliance with diet and Rx. And educate risks and autcomes.   Refill Ozempic and Insulin and doing much batter                                                     CHOL. - Hypercholesterolemia. Check lipid profile and LFT's. Target LDL at < 100 mg/dl. Educate low cholesterol diet (low calorie and low fat). Educate weight loss and exercise. Educate weight self monitoring. Educate risks and outcomes. Refill: Pravastatin 40 mg daily.      HTN - hypertension well/poorly controlled.Target BP < 130/80 well/not achieved. Educate low salt diet and exercise with weight loss. Restart ARB/ Losartan Educate home self monitoring and diary keeping.   Anxiety Disorder - +/- Depresion.  the patient extensively. refer to Psychologist.      Lymphoma - Follicular - status post chemotherapy and follows with hematology oncology - feels very well and recovered and now she is cancer free -      DM - IDDM - Hyperglycemia improved - Will review the HbA1c at 8.0 % on 03/27/2023 and fasting blood sugars. Educate home self monitoring and diary  keeping. Educate extensively low calorie diet and weight loss with exercise. Reviewed BS- diary and Rx. Regimen. [place] renal protection with ARB/ACEI. Refill: Insulin , Educate compliance with diet and Rx. And educate risks and outcomes.      Left ankle edema and swelling - recommend: X-rays and Ultrasound of the leg and rule out DVT - and circulation problems - recommend: leg elevation and compression stockings -   Leg edema and swelling - due to Venous Insufficiency. Improved and feels well - now - Recommend Compression stockings at 18-30 mmHg( and elevation of legs. Avoid standing still.Recommend walking and increase in exercise activity and weight loss. Also rule out sleep apnea and nocturnal hypoxia with overnight oximetry.Avoid sitting with legs down for along period of time (more than 30 to 40 minutes at the time). Recommend low salt diet -            High complexity visit of 30 minutes face-to-face with at least half of the time discussing my clinical findings, diagnosis and results of the blood work. The patient demonstrated good understanding of instructions and plans.      Risk Factors Identified During Visit: None.   Influenza: influenza vaccine was previously given.   Pneumovax 23/Prevnar 15: Pneumovax 23/Prevnar 15 vaccine was ordered.   Prevnar 20: Prevnar 20 vaccine was ordered.   Shingles Vaccine: Shingles vaccine was ordered.   Breast cancer screening: screening is current.   Cervical cancer screening: screening is current.   Osteoporosis screening: screening is current.   Colorectal cancer screening: screening ordered~and~an not fast - we will get a cologuard -.   HIV screening: screening not indicated.                 Immunizations/Injections       very important  Immunizations from outside sources need reconciliation.       Pfizer COVID-19 vaccine, Fall 2023, 12 years and older, (30mcg/0.3mL)10/22/2023  Pfizer Purple Cap SARS-CoV-25/10/2022, 8/30/2021, 2/3/2021,  ... (1 more)                 Immunizations/Injections      very important  Immunizations from outside sources need reconciliation.      Pfizer COVID-19 vaccine, Fall 2023, 12 years and older, (30mcg/0.3mL)10/22/2023  Pfizer COVID-19 vaccine, bivalent, age 12 years and older (30 mcg/0.3 mL)9/27/2022  Pfizer Purple Cap SARS-CoV-25/10/2022, 8/30/2021, 2/3/2021,  ... (1 more)

## 2024-09-09 NOTE — ASSESSMENT & PLAN NOTE
No recent hospitalizations.    All medications reviewed and reconciled by me the provider..  No use of controlled substances or opiates.    Family history, social history reviewed, no changes.    Patient does not smoke.    Patient does not drink.    Patient hydrates adequately daily.  Eats a well-balanced healthy diet.     Exercises adequately including walking and doing weightbearing exercises.    Patient denies any difficulty with memory or cognition.     Denies any difficulty with hearing.  Patient does not wear hearing aids.    No fall risk.  No recent falls.  Denies any difficulty walking.    Patient with no history of depression anxiety, denies any loss of interest, no feeling of sadness, no lack of motivation.    Patient is independent in all ADLs and IADLs.  Independent bathing, dressing, walking.  Takes care of own finances, shopping and cooking.     End-of-life decision-making power of  reviewed with patient.     Risk Factors Identified During Visit: None.   Influenza: influenza vaccine was previously given.   Pneumovax 23: Pneumovax 23 vaccine was previously given.   Prevnar 13: Prevnar 13 vaccine was previously given.   Shingles Vaccine: Shingles vaccine was previously given.     Colorectal Cancer Screening: screening is current.   Abdominal Aortic Aneurysm screening: screening is current.   HIV screening: screening not indicated.       Preventive measures - Recommend ASAP : PAP/Mamogram and refer patient to GYN. Specialist. Colonoscopy (educate patient risks of colon cancer) refer patient to GI specialist. Ophthalmology and retina exam recommend yearly exams refer patient to an Ophthalmologist.

## 2024-09-12 DIAGNOSIS — B00.9 HSV (HERPES SIMPLEX VIRUS) INFECTION: ICD-10-CM

## 2024-09-12 RX ORDER — VALACYCLOVIR HYDROCHLORIDE 1 G/1
1000 TABLET, FILM COATED ORAL DAILY
Qty: 90 TABLET | Refills: 2 | Status: SHIPPED | OUTPATIENT
Start: 2024-09-12

## 2024-10-03 ENCOUNTER — APPOINTMENT (OUTPATIENT)
Dept: OBSTETRICS AND GYNECOLOGY | Facility: CLINIC | Age: 69
End: 2024-10-03
Payer: MEDICARE

## 2024-10-14 DIAGNOSIS — E10.9 TYPE 1 DIABETES MELLITUS WITHOUT COMPLICATION: Primary | ICD-10-CM

## 2024-10-14 RX ORDER — INSULIN GLARGINE 100 [IU]/ML
30 INJECTION, SOLUTION SUBCUTANEOUS NIGHTLY
Qty: 30 ML | Refills: 3 | Status: SHIPPED | OUTPATIENT
Start: 2024-10-14

## 2024-10-25 ENCOUNTER — TELEPHONE (OUTPATIENT)
Dept: ENDOCRINOLOGY | Facility: CLINIC | Age: 69
End: 2024-10-25
Payer: MEDICARE

## 2024-10-25 NOTE — TELEPHONE ENCOUNTER
Pt states since she had her COVID booster on 10/11/24 her blood sugars have been erratic. Pt would like you to review her blood sugars in libriew and let her know where adjustments need to be made.     Next ov 12/23/24     Yes

## 2024-12-05 ENCOUNTER — TELEPHONE (OUTPATIENT)
Dept: PRIMARY CARE | Facility: CLINIC | Age: 69
End: 2024-12-05
Payer: MEDICARE

## 2024-12-05 ENCOUNTER — TELEPHONE (OUTPATIENT)
Dept: ENDOCRINOLOGY | Facility: CLINIC | Age: 69
End: 2024-12-05
Payer: MEDICARE

## 2024-12-05 DIAGNOSIS — R73.9 HYPERGLYCEMIA: ICD-10-CM

## 2024-12-05 DIAGNOSIS — R03.0 BLOOD PRESSURE ELEVATED WITHOUT HISTORY OF HTN: ICD-10-CM

## 2024-12-05 DIAGNOSIS — I10 PRIMARY HYPERTENSION: ICD-10-CM

## 2024-12-05 DIAGNOSIS — E78.00 ELEVATED LDL CHOLESTEROL LEVEL: ICD-10-CM

## 2024-12-05 DIAGNOSIS — E10.3299 TYPE 1 DIABETES MELLITUS WITH MILD NONPROLIFERATIVE RETINOPATHY WITHOUT MACULAR EDEMA, UNSPECIFIED LATERALITY: ICD-10-CM

## 2024-12-05 RX ORDER — ATORVASTATIN CALCIUM 40 MG/1
40 TABLET, FILM COATED ORAL DAILY
Qty: 90 TABLET | Refills: 0 | Status: SHIPPED | OUTPATIENT
Start: 2024-12-05

## 2024-12-05 RX ORDER — LOSARTAN POTASSIUM 50 MG/1
50 TABLET ORAL DAILY
Qty: 90 TABLET | Refills: 0 | Status: SHIPPED | OUTPATIENT
Start: 2024-12-05

## 2024-12-09 ENCOUNTER — LAB (OUTPATIENT)
Dept: LAB | Facility: LAB | Age: 69
End: 2024-12-09
Payer: MEDICARE

## 2024-12-09 DIAGNOSIS — R73.9 HYPERGLYCEMIA: ICD-10-CM

## 2024-12-09 DIAGNOSIS — I10 PRIMARY HYPERTENSION: ICD-10-CM

## 2024-12-09 DIAGNOSIS — E10.3299 TYPE 1 DIABETES MELLITUS WITH MILD NONPROLIFERATIVE RETINOPATHY WITHOUT MACULAR EDEMA, UNSPECIFIED LATERALITY: ICD-10-CM

## 2024-12-09 DIAGNOSIS — E78.00 ELEVATED LDL CHOLESTEROL LEVEL: ICD-10-CM

## 2024-12-09 LAB
ALBUMIN SERPL BCP-MCNC: 4.5 G/DL (ref 3.4–5)
ALP SERPL-CCNC: 57 U/L (ref 33–136)
ALT SERPL W P-5'-P-CCNC: 32 U/L (ref 7–45)
ANION GAP SERPL CALC-SCNC: 10 MMOL/L (ref 10–20)
AST SERPL W P-5'-P-CCNC: 29 U/L (ref 9–39)
BASOPHILS # BLD AUTO: 0.08 X10*3/UL (ref 0–0.1)
BASOPHILS NFR BLD AUTO: 1.3 %
BILIRUB SERPL-MCNC: 0.5 MG/DL (ref 0–1.2)
BUN SERPL-MCNC: 12 MG/DL (ref 6–23)
CALCIUM SERPL-MCNC: 9.8 MG/DL (ref 8.6–10.6)
CHLORIDE SERPL-SCNC: 100 MMOL/L (ref 98–107)
CHOLEST SERPL-MCNC: 170 MG/DL (ref 0–199)
CHOLESTEROL/HDL RATIO: 2.1
CO2 SERPL-SCNC: 30 MMOL/L (ref 21–32)
CREAT SERPL-MCNC: 0.81 MG/DL (ref 0.5–1.05)
EGFRCR SERPLBLD CKD-EPI 2021: 79 ML/MIN/1.73M*2
EOSINOPHIL # BLD AUTO: 0.19 X10*3/UL (ref 0–0.7)
EOSINOPHIL NFR BLD AUTO: 3.2 %
ERYTHROCYTE [DISTWIDTH] IN BLOOD BY AUTOMATED COUNT: 13.5 % (ref 11.5–14.5)
EST. AVERAGE GLUCOSE BLD GHB EST-MCNC: 171 MG/DL
GLUCOSE SERPL-MCNC: 147 MG/DL (ref 74–99)
HBA1C MFR BLD: 7.6 %
HCT VFR BLD AUTO: 38.2 % (ref 36–46)
HDLC SERPL-MCNC: 79.6 MG/DL
HGB BLD-MCNC: 12.4 G/DL (ref 12–16)
IMM GRANULOCYTES # BLD AUTO: 0.02 X10*3/UL (ref 0–0.7)
IMM GRANULOCYTES NFR BLD AUTO: 0.3 % (ref 0–0.9)
LDLC SERPL CALC-MCNC: 80 MG/DL
LYMPHOCYTES # BLD AUTO: 2 X10*3/UL (ref 1.2–4.8)
LYMPHOCYTES NFR BLD AUTO: 33.5 %
MCH RBC QN AUTO: 30.2 PG (ref 26–34)
MCHC RBC AUTO-ENTMCNC: 32.5 G/DL (ref 32–36)
MCV RBC AUTO: 93 FL (ref 80–100)
MONOCYTES # BLD AUTO: 0.5 X10*3/UL (ref 0.1–1)
MONOCYTES NFR BLD AUTO: 8.4 %
NEUTROPHILS # BLD AUTO: 3.18 X10*3/UL (ref 1.2–7.7)
NEUTROPHILS NFR BLD AUTO: 53.3 %
NON HDL CHOLESTEROL: 90 MG/DL (ref 0–149)
NRBC BLD-RTO: 0 /100 WBCS (ref 0–0)
PLATELET # BLD AUTO: 271 X10*3/UL (ref 150–450)
POTASSIUM SERPL-SCNC: 4.4 MMOL/L (ref 3.5–5.3)
PROT SERPL-MCNC: 6.5 G/DL (ref 6.4–8.2)
RBC # BLD AUTO: 4.11 X10*6/UL (ref 4–5.2)
SODIUM SERPL-SCNC: 136 MMOL/L (ref 136–145)
TRIGL SERPL-MCNC: 50 MG/DL (ref 0–149)
TSH SERPL-ACNC: 1.18 MIU/L (ref 0.44–3.98)
VLDL: 10 MG/DL (ref 0–40)
WBC # BLD AUTO: 6 X10*3/UL (ref 4.4–11.3)

## 2024-12-09 PROCEDURE — 80053 COMPREHEN METABOLIC PANEL: CPT

## 2024-12-09 PROCEDURE — 85025 COMPLETE CBC W/AUTO DIFF WBC: CPT

## 2024-12-09 PROCEDURE — 36415 COLL VENOUS BLD VENIPUNCTURE: CPT

## 2024-12-09 PROCEDURE — 83036 HEMOGLOBIN GLYCOSYLATED A1C: CPT

## 2024-12-09 PROCEDURE — 84443 ASSAY THYROID STIM HORMONE: CPT

## 2024-12-09 PROCEDURE — 80061 LIPID PANEL: CPT

## 2024-12-16 ENCOUNTER — APPOINTMENT (OUTPATIENT)
Dept: PRIMARY CARE | Facility: CLINIC | Age: 69
End: 2024-12-16
Payer: MEDICARE

## 2024-12-16 VITALS
DIASTOLIC BLOOD PRESSURE: 70 MMHG | WEIGHT: 145 LBS | SYSTOLIC BLOOD PRESSURE: 120 MMHG | HEIGHT: 66 IN | RESPIRATION RATE: 21 BRPM | OXYGEN SATURATION: 97 % | BODY MASS INDEX: 23.3 KG/M2 | HEART RATE: 85 BPM

## 2024-12-16 DIAGNOSIS — E10.3299 TYPE 1 DIABETES MELLITUS WITH MILD NONPROLIFERATIVE RETINOPATHY WITHOUT MACULAR EDEMA, UNSPECIFIED LATERALITY: ICD-10-CM

## 2024-12-16 DIAGNOSIS — R59.1 GENERALIZED LYMPHADENOPATHY: ICD-10-CM

## 2024-12-16 DIAGNOSIS — R53.83 OTHER FATIGUE: ICD-10-CM

## 2024-12-16 DIAGNOSIS — C82.32: ICD-10-CM

## 2024-12-16 DIAGNOSIS — C44.519 BASAL CELL CARCINOMA OF SKIN OF OTHER PART OF TRUNK: ICD-10-CM

## 2024-12-16 DIAGNOSIS — R73.9 HYPERGLYCEMIA: ICD-10-CM

## 2024-12-16 DIAGNOSIS — I10 PRIMARY HYPERTENSION: Primary | ICD-10-CM

## 2024-12-16 DIAGNOSIS — E78.00 ELEVATED LDL CHOLESTEROL LEVEL: ICD-10-CM

## 2024-12-16 PROCEDURE — 3074F SYST BP LT 130 MM HG: CPT | Performed by: INTERNAL MEDICINE

## 2024-12-16 PROCEDURE — 1159F MED LIST DOCD IN RCRD: CPT | Performed by: INTERNAL MEDICINE

## 2024-12-16 PROCEDURE — 3078F DIAST BP <80 MM HG: CPT | Performed by: INTERNAL MEDICINE

## 2024-12-16 PROCEDURE — 4010F ACE/ARB THERAPY RXD/TAKEN: CPT | Performed by: INTERNAL MEDICINE

## 2024-12-16 PROCEDURE — 3051F HG A1C>EQUAL 7.0%<8.0%: CPT | Performed by: INTERNAL MEDICINE

## 2024-12-16 PROCEDURE — 3008F BODY MASS INDEX DOCD: CPT | Performed by: INTERNAL MEDICINE

## 2024-12-16 PROCEDURE — 1157F ADVNC CARE PLAN IN RCRD: CPT | Performed by: INTERNAL MEDICINE

## 2024-12-16 PROCEDURE — 99214 OFFICE O/P EST MOD 30 MIN: CPT | Performed by: INTERNAL MEDICINE

## 2024-12-16 PROCEDURE — G2211 COMPLEX E/M VISIT ADD ON: HCPCS | Performed by: INTERNAL MEDICINE

## 2024-12-16 PROCEDURE — 1036F TOBACCO NON-USER: CPT | Performed by: INTERNAL MEDICINE

## 2024-12-16 PROCEDURE — 3048F LDL-C <100 MG/DL: CPT | Performed by: INTERNAL MEDICINE

## 2024-12-16 ASSESSMENT — ENCOUNTER SYMPTOMS
CONSTITUTIONAL NEGATIVE: 1
NEUROLOGICAL NEGATIVE: 1
EYES NEGATIVE: 1
GASTROINTESTINAL NEGATIVE: 1
PSYCHIATRIC NEGATIVE: 1
ENDOCRINE NEGATIVE: 1
MUSCULOSKELETAL NEGATIVE: 1
RESPIRATORY NEGATIVE: 1
HEMATOLOGIC/LYMPHATIC NEGATIVE: 1
ALLERGIC/IMMUNOLOGIC NEGATIVE: 1
CARDIOVASCULAR NEGATIVE: 1

## 2024-12-16 NOTE — PROGRESS NOTES
"Subjective   Patient ID: Natacha Rangel is a 69 y.o. female who presents for Follow-up (Follow up).    HPI     Review of Systems   Constitutional: Negative.    HENT: Negative.     Eyes: Negative.    Respiratory: Negative.     Cardiovascular: Negative.    Gastrointestinal: Negative.    Endocrine: Negative.    Musculoskeletal: Negative.    Skin: Negative.    Allergic/Immunologic: Negative.    Neurological: Negative.    Hematological: Negative.    Psychiatric/Behavioral: Negative.     All other systems reviewed and are negative.      Objective   /70   Pulse 85   Resp 21   Ht 1.676 m (5' 6\")   Wt 65.8 kg (145 lb)   SpO2 97%   BMI 23.40 kg/m²     Physical Exam  Vitals and nursing note reviewed.   Constitutional:       Appearance: Normal appearance.   HENT:      Head: Normocephalic and atraumatic.      Right Ear: Tympanic membrane, ear canal and external ear normal.      Left Ear: Tympanic membrane, ear canal and external ear normal. There is no impacted cerumen.      Nose: Nose normal.      Mouth/Throat:      Mouth: Mucous membranes are moist.      Pharynx: Oropharynx is clear.   Eyes:      Extraocular Movements: Extraocular movements intact.      Conjunctiva/sclera: Conjunctivae normal.      Pupils: Pupils are equal, round, and reactive to light.   Cardiovascular:      Rate and Rhythm: Normal rate and regular rhythm.      Pulses: Normal pulses.      Heart sounds: Normal heart sounds. No murmur heard.  Pulmonary:      Effort: Pulmonary effort is normal. No respiratory distress.      Breath sounds: Normal breath sounds. No stridor. No wheezing, rhonchi or rales.   Chest:      Chest wall: No tenderness.   Abdominal:      General: Abdomen is flat. Bowel sounds are normal. There is no distension.      Palpations: Abdomen is soft. There is no mass.      Tenderness: There is no abdominal tenderness. There is no right CVA tenderness, left CVA tenderness, guarding or rebound.      Hernia: No hernia is present. "   Musculoskeletal:         General: Normal range of motion.      Cervical back: Normal range of motion and neck supple.   Skin:     General: Skin is warm.      Capillary Refill: Capillary refill takes less than 2 seconds.   Neurological:      General: No focal deficit present.      Mental Status: She is alert.      Cranial Nerves: No cranial nerve deficit.      Sensory: No sensory deficit.      Motor: No weakness.      Coordination: Coordination normal.      Gait: Gait normal.      Deep Tendon Reflexes: Reflexes normal.   Psychiatric:         Mood and Affect: Mood normal.         Behavior: Behavior normal. Behavior is cooperative.         Thought Content: Thought content normal.         Judgment: Judgment normal.         Assessment/Plan   Problem List Items Addressed This Visit             ICD-10-CM    Basal cell carcinoma of skin of other part of trunk C44.519     Follows with dermatology          Elevated LDL cholesterol level E78.00     Hypercholesterolemia - Monitor lipid profile and educate patient upon risks of high cholesterol and targets. Educate diet and change in lifestyle and increase in exercises - Refill: Atorvastatin and educate compliance with medication and diet.          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 Educate low salt diet and exercise with weight loss. Educate home self monitoring and diary keeping. Educate risks of elevate blood pressure and benefits of prompt treatment.  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 Reviewed with patient / Will check HbA1c and fasting blood sugars. Educate home self monitoring and diary keeping(reviewed with patient home blood sugar levels /diary). Educate extensively low calorie diet and weight loss with exercise. Reviewed BS- diary and Rx. Regimen. [place] renal protection with ARB/ACEI. Educate compliance with diet and Rx. And educate risks and autcomes. Refill insulin and Ozempic          Relevant Orders    Comprehensive Metabolic Panel    Hemoglobin A1C     Other Visit Diagnoses         Codes    Other fatigue     R53.83    Relevant Orders    TSH with reflex to Free T4 if abnormal               Anxiety disorder F41.9        Anxiety Disorder - +/- Depresion.  the patient extensively.                 Blood pressure elevated without history of HTN R03.0       HTN - hypertension well/controlled .Target BP < 130/80  achieved. Educate low salt diet and exercise with weight loss. Educate home self monitoring and diary keeping. Educate risks of elevate blood pressure and benefits of prompt treatment.  Refill Losartan           Elevated LDL cholesterol level E78.00       Hypercholesterolemia - Monitor lipid profile and educate patient upon risks of high cholesterol and targets. Educate diet and change in lifestyle and increase in exercises - Refill: Atorvastatin and educate compliance with medication and diet.            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 Reviewed with patient / Will check  HbA1c and fasting blood sugars. Educate home self monitoring and diary keeping(reviewed with patient home blood sugar levels /diary). Educate extensively low calorie diet and weight loss with exercise. Reviewed BS- diary and Rx. Regimen. [place] renal protection with ARB/ACEI.                Educate compliance with diet and Rx. And educate risks and autcomes.      Refill insulin and Ozempic                                             Medicare annual wellness visit, subsequent - Primary Z00.00       No recent hospitalizations.     All medications reviewed and reconciled by me the provider..  No use of controlled substances or opiates.     Family history, social history reviewed, no changes.     Patient does not smoke.     Patient does not drink.     Patient hydrates adequately daily.  Eats a well-balanced healthy diet.      Exercises adequately including walking and doing weightbearing exercises.     Patient denies any difficulty with memory or cognition.      Denies any difficulty with hearing.  Patient does not wear hearing aids.     No fall risk.  No recent falls.  Denies any difficulty walking.     Patient with no history of depression anxiety, denies any loss of interest, no feeling of sadness, no lack of motivation.     Patient is independent in all ADLs and IADLs.  Independent bathing, dressing, walking.  Takes care of own finances, shopping and cooking.      End-of-life decision-making power of  reviewed with patient.      Risk Factors Identified During Visit: None.   Influenza: influenza vaccine was previously given.   Pneumovax 23: Pneumovax 23 vaccine was previously given.   Prevnar 13: Prevnar 13 vaccine was previously given.   Shingles Vaccine: Shingles vaccine was previously given.      Colorectal Cancer Screening: screening is current.   Abdominal Aortic Aneurysm screening: screening is current.   HIV screening: screening not indicated.        Preventive measures - Recommend ASAP : PAP/Mamogram and refer patient to GYN. Specialist. Colonoscopy (educate patient risks of colon cancer) refer patient to GI specialist. Ophthalmology and retina exam recommend yearly exams refer patient to an Ophthalmologist.                      Elevated LDL cholesterol level - Primary  E78.00         Hypercholesterolemia - Monitor lipid profile and educate patient upon risks of high cholesterol and targets. Educate diet and change in lifestyle and increase in exercises - Refill: Atorvastatin     and educate compliance with medication and diet.             Follicular lymphoma (Multi) C82.90       Follows with oncology and monitor CBC and stable at this point            Hypertension I10       HTN - hypertension well/controlled .Target BP < 130/80  achieved. Educate low salt diet and exercise with weight loss. Educate home self monitoring and diary keeping. Educate risks of elevate blood pressure and benefits of prompt treatment.  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 Reviewed with patient / Will check  HbA1c and fasting blood sugars. Educate home self monitoring and diary keeping(reviewed with patient home blood sugar levels /diary). Educate extensively low calorie diet and weight loss with exercise. Reviewed BS- diary and Rx. Regimen. [place] renal protection with ARB/ACEI.               Educate compliance with diet and Rx. And educate risks and autcomes.      Refill insulin and Ozempic                                                             Anxiety disorder F41.9           Anxiety Disorder - +/- Depresion.  the patient extensively.                                                                          Elevated LDL cholesterol level E78.00         Hypercholesterolemia - Monitor lipid profile and educate patient upon risks of high cholesterol and targets. Educate diet and change in lifestyle and increase in exercises - Refill: Atorvastatin     and educate compliance with medication and diet.             Follicular lymphoma (CMS/HCC) C82.90         Follows with oncology and monitor CBC and stable at this point           Hypertension - Primary I10         HTN -  hypertension well/controlled .Target BP < 130/80  achieved. Educate low salt diet and exercise with weight loss. Educate home self monitoring and diary keeping. Educate risks of elevate blood pressure and benefits of prompt treatment.            Type 1 diabetes mellitus (CMS/[facility]) E10.9         DM - IDDM  . Reviewed with patient / Will check  HbA1c at 7.3% in December 2023 and fasting blood sugars. Educate home self monitoring and diary keeping(reviewed with patient home blood sugar levels /diary). Educate extensively low calorie diet and weight loss with exercise. Reviewed BS- diary and Rx. Regimen. [place] renal protection with ARB/ACEI.               Educate compliance with diet and Rx. And educate risks and autcomes.   Refill Ozempic and Insulin and doing much batter                                                     CHOL. - Hypercholesterolemia. Check lipid profile and LFT's. Target LDL at < 100 mg/dl. Educate low cholesterol diet (low calorie and low fat). Educate weight loss and exercise. Educate weight self monitoring. Educate risks and outcomes. Refill: Pravastatin 40 mg daily.      HTN - hypertension well/poorly controlled.Target BP < 130/80 well/not achieved. Educate low salt diet and exercise with weight loss. Restart ARB/ Losartan Educate home self monitoring and diary keeping.   Anxiety Disorder - +/- Depresion.  the patient extensively. refer to Psychologist.      Lymphoma - Follicular - status post chemotherapy and follows with hematology oncology - feels very well and recovered and now she is cancer free -      DM - IDDM - Hyperglycemia improved - Will review the HbA1c at 8.0 % on 03/27/2023 and fasting blood sugars. Educate home self monitoring and diary keeping. Educate extensively low calorie diet and weight loss with exercise. Reviewed BS- diary and Rx. Regimen. [place] renal protection with ARB/ACEI. Refill: Insulin , Educate compliance with diet and Rx. And educate risks and  outcomes.      Left ankle edema and swelling - recommend: X-rays and Ultrasound of the leg and rule out DVT - and circulation problems - recommend: leg elevation and compression stockings -   Leg edema and swelling - due to Venous Insufficiency. Improved and feels well - now - Recommend Compression stockings at 18-30 mmHg( and elevation of legs. Avoid standing still.Recommend walking and increase in exercise activity and weight loss. Also rule out sleep apnea and nocturnal hypoxia with overnight oximetry.Avoid sitting with legs down for along period of time (more than 30 to 40 minutes at the time). Recommend low salt diet -            High complexity visit of 30 minutes face-to-face with at least half of the time discussing my clinical findings, diagnosis and results of the blood work. The patient demonstrated good understanding of instructions and plans.      Risk Factors Identified During Visit: None.   Influenza: influenza vaccine was previously given.   Pneumovax 23/Prevnar 15: Pneumovax 23/Prevnar 15 vaccine was ordered.   Prevnar 20: Prevnar 20 vaccine was ordered.   Shingles Vaccine: Shingles vaccine was ordered.   Breast cancer screening: screening is current.   Cervical cancer screening: screening is current.   Osteoporosis screening: screening is current.   Colorectal cancer screening: screening ordered~and~an not fast - we will get a cologuard -.   HIV screening: screening not indicated.                 Immunizations/Injections       very important  Immunizations from outside sources need reconciliation.       Pfizer COVID-19 vaccine, Fall 2023, 12 years and older, (30mcg/0.3mL)10/22/2023  Pfizer Purple Cap SARS-CoV-25/10/2022, 8/30/2021, 2/3/2021,  ... (1 more)                  Immunizations/Injections       very important  Immunizations from outside sources need reconciliation.       Pfizer COVID-19 vaccine, Fall 2023, 12 years and older, (30mcg/0.3mL)10/22/2023  Pfizer COVID-19 vaccine, bivalent, age  12 years and older (30 mcg/0.3 mL)9/27/2022  Pfizer Purple Cap SARS-CoV-25/10/2022, 8/30/2021, 2/3/2021,  ... (1 more)

## 2024-12-16 NOTE — ASSESSMENT & PLAN NOTE
HTN - hypertension well/controlled .Target BP < 130/80  achieved. Educate low salt diet and exercise with weight loss. Educate home self monitoring and diary keeping. Educate risks of elevate blood pressure and benefits of prompt treatment.  Refill Losartan 50 mg daily

## 2024-12-23 ENCOUNTER — APPOINTMENT (OUTPATIENT)
Dept: ENDOCRINOLOGY | Facility: CLINIC | Age: 69
End: 2024-12-23
Payer: MEDICARE

## 2024-12-23 VITALS
RESPIRATION RATE: 16 BRPM | BODY MASS INDEX: 23.4 KG/M2 | DIASTOLIC BLOOD PRESSURE: 70 MMHG | HEIGHT: 66 IN | SYSTOLIC BLOOD PRESSURE: 120 MMHG | HEART RATE: 70 BPM

## 2024-12-23 DIAGNOSIS — E10.9 TYPE 1 DIABETES MELLITUS WITHOUT COMPLICATION: Primary | ICD-10-CM

## 2024-12-23 DIAGNOSIS — I10 HYPERTENSION, UNSPECIFIED TYPE: ICD-10-CM

## 2024-12-23 DIAGNOSIS — M81.0 OSTEOPOROSIS, UNSPECIFIED OSTEOPOROSIS TYPE, UNSPECIFIED PATHOLOGICAL FRACTURE PRESENCE: ICD-10-CM

## 2024-12-23 DIAGNOSIS — E78.5 HYPERLIPIDEMIA, UNSPECIFIED HYPERLIPIDEMIA TYPE: ICD-10-CM

## 2024-12-23 PROCEDURE — 3051F HG A1C>EQUAL 7.0%<8.0%: CPT | Performed by: INTERNAL MEDICINE

## 2024-12-23 PROCEDURE — 4010F ACE/ARB THERAPY RXD/TAKEN: CPT | Performed by: INTERNAL MEDICINE

## 2024-12-23 PROCEDURE — 3078F DIAST BP <80 MM HG: CPT | Performed by: INTERNAL MEDICINE

## 2024-12-23 PROCEDURE — 1157F ADVNC CARE PLAN IN RCRD: CPT | Performed by: INTERNAL MEDICINE

## 2024-12-23 PROCEDURE — 3048F LDL-C <100 MG/DL: CPT | Performed by: INTERNAL MEDICINE

## 2024-12-23 PROCEDURE — 99214 OFFICE O/P EST MOD 30 MIN: CPT | Performed by: INTERNAL MEDICINE

## 2024-12-23 PROCEDURE — 1036F TOBACCO NON-USER: CPT | Performed by: INTERNAL MEDICINE

## 2024-12-23 PROCEDURE — 1160F RVW MEDS BY RX/DR IN RCRD: CPT | Performed by: INTERNAL MEDICINE

## 2024-12-23 PROCEDURE — G2211 COMPLEX E/M VISIT ADD ON: HCPCS | Performed by: INTERNAL MEDICINE

## 2024-12-23 PROCEDURE — 3074F SYST BP LT 130 MM HG: CPT | Performed by: INTERNAL MEDICINE

## 2024-12-23 PROCEDURE — 1159F MED LIST DOCD IN RCRD: CPT | Performed by: INTERNAL MEDICINE

## 2024-12-23 ASSESSMENT — ENCOUNTER SYMPTOMS
FATIGUE: 0
HEADACHES: 0
CHILLS: 0
DIARRHEA: 0
FEVER: 0
PALPITATIONS: 0
SHORTNESS OF BREATH: 0
NAUSEA: 0
VOMITING: 0
COUGH: 0

## 2024-12-23 NOTE — ASSESSMENT & PLAN NOTE
Sugars are great, much improved; fewer lows  On ozempic for weight, she feels still benefitting her, no side effects

## 2024-12-23 NOTE — PROGRESS NOTES
Endocrinology: Follow up visit  Subjective   Patient ID: Natacha Rangel is a 69 y.o. female who presents for Diabetes (Type 1 ).    PCP: Godwin Londono MD    HPI  Dm1 on MDI and rachel  Since last visit doing much better with sugars.   Much fewer lows.  Not stacking as much.   Some minor skin procedured but otherwise doing great.       Checks sugars 4x a day  Injects insulin 5-7x a day  Currently utilizing cgm to check sugars  Review of Systems   Constitutional:  Negative for chills, fatigue and fever.   Respiratory:  Negative for cough and shortness of breath.    Cardiovascular:  Negative for chest pain and palpitations.   Gastrointestinal:  Negative for diarrhea, nausea and vomiting.   Neurological:  Negative for headaches.       Patient Active Problem List   Diagnosis    Acute right ankle pain    Anisocoria    Anxiety disorder    Basal cell carcinoma of skin of other part of trunk    Blood pressure elevated without history of HTN    Bone mass    Osteoporosis    Cellulitis of right thumb    Chest wall mass    Diabetic hypoglycemia (Multi)    Hypoglycemia    Elevated LDL cholesterol level    Follicular lymphoma    Ganglion cyst    Generalized lymphadenopathy    Headache    History of breast cancer    Hyperglycemia    Hypertension    Drug reaction    Immunization reaction    Infection of thumb    Generalized pruritus    Scar condition and fibrosis of skin    Melanocytic nevi of trunk    Melanoma of face (Multi)    Neoplasm of uncertain behavior of skin    Other melanin hyperpigmentation    Other seborrheic keratosis    Panaritium of finger    Paronychia of finger of right hand    Personal history of malignant melanoma of skin    Pleural effusion    Pure hypercholesterolemia    Right foot pain    Seasonal allergies    Sprain of right ankle    Syncope and collapse    Tension headache    Thumb pain, right    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    FreeStyle Kimberly 2 [place] misc USE CONTINUOUSLY TO MONITOR BLOOD SUGARS DAILY    FreeStyle Kimberly sensor system (FreeStyle Kimberly 2 Sensor) kit apply 1 SENSOR to back OF UPPER ARM REMOVE AND REPLACE every 14 days    HumaLOG KwikPen Insulin 100 unit/mL injection Take as directed per insulin instructions up to 20 units daily    ibandronate (Boniva) 150 mg tablet take 1 tablet by mouth every month ON THE SAME DATE WITH FULL GLA...  (REFER TO PRESCRIPTION NOTES).    Lantus Solostar U-100 Insulin 30 Units, subcutaneous, Nightly    losartan (COZAAR) 50 mg, oral, Daily    OneTouch Ultra Test strip test every 2 hours dx:E10.9,c79.9    Ozempic 0.5 mg, subcutaneous, Once Weekly    pen needle, diabetic (Droplet Pen Needle) 32 gauge x 5/32\" needle use with INSULIN seven times a day    valACYclovir (VALTREX) 1,000 mg, oral, Daily        No Known Allergies     Objective   Vitals:    12/23/24 1414   BP: 120/70   Pulse: 70   Resp: 16      Vitals:      Body mass index is 23.4 kg/m².   Physical Exam  Constitutional:       Appearance: Normal appearance. She is normal weight.   HENT:      Head: Normocephalic and atraumatic.   Neck:      Thyroid: No thyroid mass, thyromegaly or thyroid tenderness.   Cardiovascular:      Rate and Rhythm: Normal rate and regular rhythm.      Heart sounds: No murmur heard.     No gallop.   Pulmonary:      Effort: Pulmonary effort is normal.      Breath sounds: Normal breath sounds.   Abdominal:      Palpations: Abdomen is soft.      Comments: benign   Neurological:      General: No focal deficit present.      Mental Status: She is alert and oriented to person, place, and time.      Deep Tendon " Reflexes: Reflexes are normal and symmetric.   Psychiatric:         Behavior: Behavior is cooperative.         Labs:  Lab Results   Component Value Date    HGBA1C 7.6 (H) 12/09/2024    TSH 1.18 12/09/2024    FREET4 0.90 12/04/2023      Lab Results   Component Value Date    PR1 AMRIT 03/14/2018        Assessment/Plan   Assessment & Plan  Type 1 diabetes mellitus without complication    Sugars are great, much improved; fewer lows  On ozempic for weight, she feels still benefitting her, no side effects  Hypertension, unspecified type    Bp excellent  Hyperlipidemia, unspecified hyperlipidemia type    Stable on statin  Osteoporosis, unspecified osteoporosis type, unspecified pathological fracture presence    On ibandronate since 2022: will discuss dexa next time      Electronically signed by:  Vielka Arevalo MD 12/23/24 2:15 PM

## 2025-01-14 DIAGNOSIS — E10.9 TYPE 1 DIABETES MELLITUS WITHOUT COMPLICATION: ICD-10-CM

## 2025-01-14 RX ORDER — SEMAGLUTIDE 0.68 MG/ML
INJECTION, SOLUTION SUBCUTANEOUS
Qty: 9 ML | Refills: 2 | Status: SHIPPED | OUTPATIENT
Start: 2025-01-14

## 2025-03-03 DIAGNOSIS — E78.00 ELEVATED LDL CHOLESTEROL LEVEL: ICD-10-CM

## 2025-03-03 DIAGNOSIS — R03.0 BLOOD PRESSURE ELEVATED WITHOUT HISTORY OF HTN: ICD-10-CM

## 2025-03-03 RX ORDER — ATORVASTATIN CALCIUM 40 MG/1
40 TABLET, FILM COATED ORAL DAILY
Qty: 90 TABLET | Refills: 0 | Status: SHIPPED | OUTPATIENT
Start: 2025-03-03

## 2025-03-03 RX ORDER — LOSARTAN POTASSIUM 50 MG/1
50 TABLET ORAL DAILY
Qty: 90 TABLET | Refills: 0 | Status: SHIPPED | OUTPATIENT
Start: 2025-03-03

## 2025-04-10 DIAGNOSIS — E78.00 ELEVATED LDL CHOLESTEROL LEVEL: Primary | ICD-10-CM

## 2025-04-10 LAB
ALBUMIN SERPL-MCNC: 4.4 G/DL (ref 3.6–5.1)
ALBUMIN SERPL-MCNC: 4.7 G/DL (ref 3.6–5.1)
ALP SERPL-CCNC: 51 U/L (ref 37–153)
ALP SERPL-CCNC: 52 U/L (ref 37–153)
ALT SERPL-CCNC: 43 U/L (ref 6–29)
ALT SERPL-CCNC: 44 U/L (ref 6–29)
ANION GAP SERPL CALCULATED.4IONS-SCNC: 9 MMOL/L (CALC) (ref 7–17)
ANION GAP SERPL CALCULATED.4IONS-SCNC: 9 MMOL/L (CALC) (ref 7–17)
AST SERPL-CCNC: 38 U/L (ref 10–35)
AST SERPL-CCNC: 40 U/L (ref 10–35)
BASOPHILS # BLD AUTO: 63 CELLS/UL (ref 0–200)
BASOPHILS NFR BLD AUTO: 1.1 %
BILIRUB SERPL-MCNC: 0.6 MG/DL (ref 0.2–1.2)
BILIRUB SERPL-MCNC: 0.6 MG/DL (ref 0.2–1.2)
BUN SERPL-MCNC: 15 MG/DL (ref 7–25)
BUN SERPL-MCNC: 15 MG/DL (ref 7–25)
CALCIUM SERPL-MCNC: 10 MG/DL (ref 8.6–10.4)
CALCIUM SERPL-MCNC: 10.2 MG/DL (ref 8.6–10.4)
CHLORIDE SERPL-SCNC: 95 MMOL/L (ref 98–110)
CHLORIDE SERPL-SCNC: 96 MMOL/L (ref 98–110)
CHOLEST SERPL-MCNC: 164 MG/DL
CHOLEST/HDLC SERPL: 2.3 (CALC)
CO2 SERPL-SCNC: 28 MMOL/L (ref 20–32)
CO2 SERPL-SCNC: 28 MMOL/L (ref 20–32)
CREAT SERPL-MCNC: 0.85 MG/DL (ref 0.5–1.05)
CREAT SERPL-MCNC: 0.91 MG/DL (ref 0.5–1.05)
EGFRCR SERPLBLD CKD-EPI 2021: 69 ML/MIN/1.73M2
EGFRCR SERPLBLD CKD-EPI 2021: 75 ML/MIN/1.73M2
EOSINOPHIL # BLD AUTO: 160 CELLS/UL (ref 15–500)
EOSINOPHIL NFR BLD AUTO: 2.8 %
ERYTHROCYTE [DISTWIDTH] IN BLOOD BY AUTOMATED COUNT: 12 % (ref 11–15)
EST. AVERAGE GLUCOSE BLD GHB EST-MCNC: 183 MG/DL
EST. AVERAGE GLUCOSE BLD GHB EST-MCNC: 183 MG/DL
EST. AVERAGE GLUCOSE BLD GHB EST-SCNC: 10.1 MMOL/L
EST. AVERAGE GLUCOSE BLD GHB EST-SCNC: 10.1 MMOL/L
GLUCOSE SERPL-MCNC: 197 MG/DL (ref 65–99)
GLUCOSE SERPL-MCNC: 207 MG/DL (ref 65–99)
HBA1C MFR BLD: 8 % OF TOTAL HGB
HBA1C MFR BLD: 8 % OF TOTAL HGB
HCT VFR BLD AUTO: 39.3 % (ref 35–45)
HDLC SERPL-MCNC: 71 MG/DL
HGB BLD-MCNC: 12.6 G/DL (ref 11.7–15.5)
LDLC SERPL CALC-MCNC: 80 MG/DL (CALC)
LYMPHOCYTES # BLD AUTO: 1436 CELLS/UL (ref 850–3900)
LYMPHOCYTES NFR BLD AUTO: 25.2 %
MCH RBC QN AUTO: 29.8 PG (ref 27–33)
MCHC RBC AUTO-ENTMCNC: 32.1 G/DL (ref 32–36)
MCV RBC AUTO: 92.9 FL (ref 80–100)
MONOCYTES # BLD AUTO: 274 CELLS/UL (ref 200–950)
MONOCYTES NFR BLD AUTO: 4.8 %
NEUTROPHILS # BLD AUTO: 3768 CELLS/UL (ref 1500–7800)
NEUTROPHILS NFR BLD AUTO: 66.1 %
NONHDLC SERPL-MCNC: 93 MG/DL (CALC)
PLATELET # BLD AUTO: 279 THOUSAND/UL (ref 140–400)
PMV BLD REES-ECKER: 9.2 FL (ref 7.5–12.5)
POTASSIUM SERPL-SCNC: 4.3 MMOL/L (ref 3.5–5.3)
POTASSIUM SERPL-SCNC: 5.3 MMOL/L (ref 3.5–5.3)
PROT SERPL-MCNC: 6.6 G/DL (ref 6.1–8.1)
PROT SERPL-MCNC: 6.7 G/DL (ref 6.1–8.1)
RBC # BLD AUTO: 4.23 MILLION/UL (ref 3.8–5.1)
SODIUM SERPL-SCNC: 132 MMOL/L (ref 135–146)
SODIUM SERPL-SCNC: 133 MMOL/L (ref 135–146)
TRIGL SERPL-MCNC: 58 MG/DL
TSH SERPL-ACNC: 1.34 MIU/L (ref 0.4–4.5)
WBC # BLD AUTO: 5.7 THOUSAND/UL (ref 3.8–10.8)

## 2025-04-10 RX ORDER — ATORVASTATIN CALCIUM 20 MG/1
20 TABLET, FILM COATED ORAL DAILY
Qty: 30 TABLET | Refills: 0 | Status: SHIPPED | OUTPATIENT
Start: 2025-04-10 | End: 2025-05-10

## 2025-04-11 ENCOUNTER — TELEPHONE (OUTPATIENT)
Facility: CLINIC | Age: 70
End: 2025-04-11
Payer: MEDICARE

## 2025-04-11 NOTE — TELEPHONE ENCOUNTER
P;t called in asking you to review her blood sugar readings in [facility] as her number are up. Pt would like to know if she should pause ozempic at this time as she thought it would help her A1C but she sees that it has gone up . Patient is on humalog 2units with meal ans lantus 12 units in AM and pm    Next ov 04/28/25

## 2025-04-21 ENCOUNTER — APPOINTMENT (OUTPATIENT)
Dept: PRIMARY CARE | Facility: CLINIC | Age: 70
End: 2025-04-21
Payer: MEDICARE

## 2025-04-21 VITALS
DIASTOLIC BLOOD PRESSURE: 70 MMHG | OXYGEN SATURATION: 97 % | WEIGHT: 155 LBS | RESPIRATION RATE: 21 BRPM | HEART RATE: 88 BPM | SYSTOLIC BLOOD PRESSURE: 120 MMHG | BODY MASS INDEX: 24.91 KG/M2 | HEIGHT: 66 IN

## 2025-04-21 DIAGNOSIS — C82.32: ICD-10-CM

## 2025-04-21 DIAGNOSIS — E10.59 TYPE 1 DIABETES MELLITUS WITH OTHER CIRCULATORY COMPLICATION: ICD-10-CM

## 2025-04-21 DIAGNOSIS — E78.00 ELEVATED LDL CHOLESTEROL LEVEL: ICD-10-CM

## 2025-04-21 DIAGNOSIS — R94.5 ABNORMAL LIVER FUNCTION: Primary | ICD-10-CM

## 2025-04-21 DIAGNOSIS — R03.0 BLOOD PRESSURE ELEVATED WITHOUT HISTORY OF HTN: ICD-10-CM

## 2025-04-21 PROCEDURE — 3078F DIAST BP <80 MM HG: CPT | Performed by: INTERNAL MEDICINE

## 2025-04-21 PROCEDURE — 1157F ADVNC CARE PLAN IN RCRD: CPT | Performed by: INTERNAL MEDICINE

## 2025-04-21 PROCEDURE — G2211 COMPLEX E/M VISIT ADD ON: HCPCS | Performed by: INTERNAL MEDICINE

## 2025-04-21 PROCEDURE — 4010F ACE/ARB THERAPY RXD/TAKEN: CPT | Performed by: INTERNAL MEDICINE

## 2025-04-21 PROCEDURE — 3074F SYST BP LT 130 MM HG: CPT | Performed by: INTERNAL MEDICINE

## 2025-04-21 PROCEDURE — 1159F MED LIST DOCD IN RCRD: CPT | Performed by: INTERNAL MEDICINE

## 2025-04-21 PROCEDURE — 1036F TOBACCO NON-USER: CPT | Performed by: INTERNAL MEDICINE

## 2025-04-21 PROCEDURE — 99214 OFFICE O/P EST MOD 30 MIN: CPT | Performed by: INTERNAL MEDICINE

## 2025-04-21 PROCEDURE — 3008F BODY MASS INDEX DOCD: CPT | Performed by: INTERNAL MEDICINE

## 2025-04-21 ASSESSMENT — ENCOUNTER SYMPTOMS
NEUROLOGICAL NEGATIVE: 1
GASTROINTESTINAL NEGATIVE: 1
CONSTITUTIONAL NEGATIVE: 1
HEMATOLOGIC/LYMPHATIC NEGATIVE: 1
ALLERGIC/IMMUNOLOGIC NEGATIVE: 1
EYES NEGATIVE: 1
ENDOCRINE NEGATIVE: 1
RESPIRATORY NEGATIVE: 1
MUSCULOSKELETAL NEGATIVE: 1
PSYCHIATRIC NEGATIVE: 1
CARDIOVASCULAR NEGATIVE: 1

## 2025-04-21 NOTE — ASSESSMENT & PLAN NOTE
Possibly due to Atorvastatin   vs. Ozempic  - off the Ozempic and lowered the dose of the Atorvastatin   to 20 mg daily from 40- mg daily

## 2025-04-21 NOTE — ASSESSMENT & PLAN NOTE
DM - NIDDM  . Reviewed with patient / Will check  HbA1c and fasting blood sugars. Educate home self monitoring and diary keeping(reviewed with patient home blood sugar levels /diary). Educate extensively low calorie diet and weight loss with exercise. Reviewed BS- diary and Rx. Regimen. [place] renal protection with ARB/ACEI.               Educate compliance with diet and Rx. And educate risks and autcomes.   Continues the Insulin off Ozempic

## 2025-04-21 NOTE — PROGRESS NOTES
"Subjective   Patient ID: Natacha Rangel is a 69 y.o. female who presents for Follow-up (4 month follow up).    HPI     Review of Systems   Constitutional: Negative.    HENT: Negative.     Eyes: Negative.    Respiratory: Negative.     Cardiovascular: Negative.    Gastrointestinal: Negative.    Endocrine: Negative.    Musculoskeletal: Negative.    Skin: Negative.    Allergic/Immunologic: Negative.    Neurological: Negative.    Hematological: Negative.    Psychiatric/Behavioral: Negative.     All other systems reviewed and are negative.      Objective   /70   Pulse 88   Resp 21   Ht 1.676 m (5' 6\")   Wt 70.3 kg (155 lb)   SpO2 97%   BMI 25.02 kg/m²     Physical Exam  Vitals and nursing note reviewed.   Constitutional:       Appearance: Normal appearance.   HENT:      Head: Normocephalic and atraumatic.      Right Ear: Tympanic membrane, ear canal and external ear normal.      Left Ear: Tympanic membrane, ear canal and external ear normal. There is no impacted cerumen.      Nose: Nose normal.      Mouth/Throat:      Mouth: Mucous membranes are moist.      Pharynx: Oropharynx is clear.   Eyes:      Extraocular Movements: Extraocular movements intact.      Conjunctiva/sclera: Conjunctivae normal.      Pupils: Pupils are equal, round, and reactive to light.   Cardiovascular:      Rate and Rhythm: Normal rate and regular rhythm.      Pulses: Normal pulses.      Heart sounds: Normal heart sounds. No murmur heard.  Pulmonary:      Effort: Pulmonary effort is normal. No respiratory distress.      Breath sounds: Normal breath sounds. No stridor. No wheezing, rhonchi or rales.   Chest:      Chest wall: No tenderness.   Abdominal:      General: Abdomen is flat. Bowel sounds are normal. There is no distension.      Palpations: Abdomen is soft. There is no mass.      Tenderness: There is no abdominal tenderness. There is no right CVA tenderness, left CVA tenderness, guarding or rebound.      Hernia: No hernia is " present.   Musculoskeletal:         General: Normal range of motion.      Cervical back: Normal range of motion and neck supple.   Skin:     General: Skin is warm.      Capillary Refill: Capillary refill takes less than 2 seconds.   Neurological:      General: No focal deficit present.      Mental Status: She is alert.      Cranial Nerves: No cranial nerve deficit.      Sensory: No sensory deficit.      Motor: No weakness.      Coordination: Coordination normal.      Gait: Gait normal.      Deep Tendon Reflexes: Reflexes normal.   Psychiatric:         Mood and Affect: Mood normal.         Behavior: Behavior normal. Behavior is cooperative.         Thought Content: Thought content normal.         Judgment: Judgment normal.         Assessment/Plan   Problem List Items Addressed This Visit           ICD-10-CM    Blood pressure elevated without history of HTN R03.0    HTN - hypertension well/controlled .Target BP < 130/80 achieved. Educate low salt diet and exercise with weight loss. Educate home self monitoring and diary keeping. Educate risks of elevate blood pressure and benefits of prompt treatment. Refill Losartan          Elevated LDL cholesterol level E78.00    Hypercholesterolemia - Monitor lipid profile and educate patient upon risks of high cholesterol and targets. Educate diet and change in lifestyle and increase in exercises - Refill: Atorvastatin and educate compliance with medication and diet.          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 Reviewed with patient / Will check  HbA1c and fasting blood sugars. Educate home self monitoring and diary keeping(reviewed with patient home blood sugar levels /diary). Educate extensively low calorie diet and weight loss with exercise. Reviewed BS- diary and Rx. Regimen. [place] renal protection with ARB/ACEI.               Educate compliance with diet  and Rx. And educate risks and autcomes.   Continues the Insulin off Ozempic                                              Abnormal liver function - Primary R94.5    Possibly due to Atorvastatin   vs. Ozempic  - off the Ozempic and lowered the dose of the Atorvastatin   to 20 mg daily from 40- mg daily

## 2025-04-28 ENCOUNTER — APPOINTMENT (OUTPATIENT)
Dept: ENDOCRINOLOGY | Facility: CLINIC | Age: 70
End: 2025-04-28
Payer: MEDICARE

## 2025-04-28 VITALS
WEIGHT: 153.6 LBS | HEART RATE: 76 BPM | DIASTOLIC BLOOD PRESSURE: 70 MMHG | BODY MASS INDEX: 24.68 KG/M2 | SYSTOLIC BLOOD PRESSURE: 124 MMHG | HEIGHT: 66 IN | RESPIRATION RATE: 16 BRPM

## 2025-04-28 DIAGNOSIS — E78.5 HYPERLIPIDEMIA, UNSPECIFIED HYPERLIPIDEMIA TYPE: ICD-10-CM

## 2025-04-28 DIAGNOSIS — I10 HYPERTENSION, UNSPECIFIED TYPE: ICD-10-CM

## 2025-04-28 DIAGNOSIS — M81.0 OSTEOPOROSIS, UNSPECIFIED OSTEOPOROSIS TYPE, UNSPECIFIED PATHOLOGICAL FRACTURE PRESENCE: ICD-10-CM

## 2025-04-28 DIAGNOSIS — E10.9 TYPE 1 DIABETES MELLITUS WITHOUT COMPLICATION: Primary | ICD-10-CM

## 2025-04-28 PROCEDURE — 1159F MED LIST DOCD IN RCRD: CPT | Performed by: INTERNAL MEDICINE

## 2025-04-28 PROCEDURE — 1160F RVW MEDS BY RX/DR IN RCRD: CPT | Performed by: INTERNAL MEDICINE

## 2025-04-28 PROCEDURE — G2211 COMPLEX E/M VISIT ADD ON: HCPCS | Performed by: INTERNAL MEDICINE

## 2025-04-28 PROCEDURE — 99214 OFFICE O/P EST MOD 30 MIN: CPT | Performed by: INTERNAL MEDICINE

## 2025-04-28 PROCEDURE — 3078F DIAST BP <80 MM HG: CPT | Performed by: INTERNAL MEDICINE

## 2025-04-28 PROCEDURE — 3074F SYST BP LT 130 MM HG: CPT | Performed by: INTERNAL MEDICINE

## 2025-04-28 PROCEDURE — 1157F ADVNC CARE PLAN IN RCRD: CPT | Performed by: INTERNAL MEDICINE

## 2025-04-28 PROCEDURE — 1036F TOBACCO NON-USER: CPT | Performed by: INTERNAL MEDICINE

## 2025-04-28 PROCEDURE — 4010F ACE/ARB THERAPY RXD/TAKEN: CPT | Performed by: INTERNAL MEDICINE

## 2025-04-28 PROCEDURE — 3008F BODY MASS INDEX DOCD: CPT | Performed by: INTERNAL MEDICINE

## 2025-04-28 RX ORDER — BLOOD-GLUCOSE SENSOR
EACH MISCELLANEOUS
COMMUNITY

## 2025-04-28 RX ORDER — BLOOD-GLUCOSE SENSOR
EACH MISCELLANEOUS
Qty: 6 EACH | Refills: 1 | Status: SHIPPED | OUTPATIENT
Start: 2025-04-28

## 2025-04-28 ASSESSMENT — ENCOUNTER SYMPTOMS
DIARRHEA: 0
NAUSEA: 0
FATIGUE: 0
FEVER: 0
VOMITING: 0
CHILLS: 0
COUGH: 0
SHORTNESS OF BREATH: 0
HEADACHES: 0
PALPITATIONS: 0

## 2025-04-28 NOTE — ASSESSMENT & PLAN NOTE
Able to download 3 zeinab for her and set up  Discussed no difference in operation of it vs 2  Discussed numbers.  Recommend increase in lantus in pm to 14 units  Continue am at 12 units  Meal at 2-5 per meals   Again discussed glp1s are not proven to effect sugars in dm1 beyond weight and she has not lost weight on ozempic+bmi normal.   I do not recommend continued use

## 2025-04-28 NOTE — PROGRESS NOTES
Endocrinology: Follow up visit  Subjective   Patient ID: Natacha Rangel is a 69 y.o. female who presents for Diabetes (Type 1 ).    PCP: Godwin Londono MD    HPI  Dm1 on MDI and kimberly  Since last visit doing ok with sugars.   70% tir on review of kimberly data.   Concerned about moving to kimberly 3+.  Had reaction to  vaccine since last time and attributes this to A1c being up.    Much fewer lows.  Not stacking as much.       Was on ozempic for her weight concerns for quite some time but now off. Did not lose any weight on it    Checks sugars 4x a day  Injects insulin 5-7x a day  Currently utilizing cgm to check sugars\    Review of Systems   Constitutional:  Negative for chills, fatigue and fever.   Respiratory:  Negative for cough and shortness of breath.    Cardiovascular:  Negative for chest pain and palpitations.   Gastrointestinal:  Negative for diarrhea, nausea and vomiting.   Neurological:  Negative for headaches.       Problem List[1]     Home Meds:  Current Outpatient Medications   Medication Instructions    atorvastatin (LIPITOR) 40 mg, oral, Daily    atorvastatin (LIPITOR) 20 mg, oral, Daily    cyanocobalamin (Vitamin B-12) 1,000 mcg tablet Take by mouth.    FreeStyle Kimberly 2 [place] misc USE CONTINUOUSLY TO MONITOR BLOOD SUGARS DAILY    FreeStyle Kimberly sensor system (FreeStyle Kimberly 2 Sensor) kit apply 1 SENSOR to back OF UPPER ARM REMOVE AND REPLACE every 14 days    HumaLOG KwikPen Insulin 100 unit/mL injection Take as directed per insulin instructions up to 20 units daily    ibandronate (Boniva) 150 mg tablet take 1 tablet by mouth every month ON THE SAME DATE WITH FULL GLA...  (REFER TO PRESCRIPTION NOTES).    Lantus Solostar U-100 Insulin 30 Units, subcutaneous, Nightly    losartan (COZAAR) 50 mg, oral, Daily    OneTouch Ultra Test strip test every 2 hours dx:E10.9,c79.9    Ozempic 0.25 mg or 0.5 mg (2 mg/3 mL) pen injector Inject 0.5 mg subcutaneously every week    pen needle, diabetic (Droplet Pen  "Needle) 32 gauge x 5/32\" needle use with INSULIN seven times a day    valACYclovir (VALTREX) 1,000 mg, oral, Daily        RX Allergies[2]     Objective   Vitals:    04/28/25 1231   BP: 124/70   Pulse: 76   Resp: 16      Vitals:    04/28/25 1231   Weight: 69.7 kg (153 lb 9.6 oz)      Body mass index is 24.79 kg/m².   Physical Exam  Constitutional:       Appearance: Normal appearance. She is normal weight.   HENT:      Head: Normocephalic and atraumatic.   Neck:      Thyroid: No thyroid mass, thyromegaly or thyroid tenderness.   Cardiovascular:      Rate and Rhythm: Normal rate and regular rhythm.      Heart sounds: No murmur heard.     No gallop.   Pulmonary:      Effort: Pulmonary effort is normal.      Breath sounds: Normal breath sounds.   Abdominal:      Palpations: Abdomen is soft.      Comments: benign   Neurological:      General: No focal deficit present.      Mental Status: She is alert and oriented to person, place, and time.      Deep Tendon Reflexes: Reflexes are normal and symmetric.   Psychiatric:         Behavior: Behavior is cooperative.       Labs:  Lab Results   Component Value Date    HGBA1C 8.0 (H) 04/09/2025    TSH 1.34 04/09/2025    FREET4 0.90 12/04/2023      Lab Results   Component Value Date    PR1 AMRIT 03/14/2018        Assessment/Plan   Assessment & Plan  Type 1 diabetes mellitus without complication  Able to download 3 zeinab for her and set up  Discussed no difference in operation of it vs 2  Discussed numbers.  Recommend increase in lantus in pm to 14 units  Continue am at 12 units  Meal at 2-5 per meals   Again discussed glp1s are not proven to effect sugars in dm1 beyond weight and she has not lost weight on ozempic+bmi normal.   I do not recommend continued use  Hypertension, unspecified type    Bp excellent  Hyperlipidemia, unspecified hyperlipidemia type    Stable on statin  Osteoporosis, unspecified osteoporosis type, unspecified pathological fracture presence    On ibandronate " since 2022  Due for dexa, will leave up to new endo/pcp      Electronically signed by:  Vielka Arevalo MD 04/28/25 12:32 PM                   [1]   Patient Active Problem List  Diagnosis    Acute right ankle pain    Anisocoria    Anxiety disorder    Basal cell carcinoma of skin of other part of trunk    Blood pressure elevated without history of HTN    Bone mass    Osteoporosis    Cellulitis of right thumb    Chest wall mass    Diabetic hypoglycemia (Multi)    Hypoglycemia    Elevated LDL cholesterol level    Follicular lymphoma    Ganglion cyst    Generalized lymphadenopathy    Headache    History of breast cancer    Hyperglycemia    Hypertension    Drug reaction    Immunization reaction    Infection of thumb    Generalized pruritus    Scar condition and fibrosis of skin    Melanocytic nevi of trunk    Melanoma of face (Multi)    Neoplasm of uncertain behavior of skin    Other melanin hyperpigmentation    Other seborrheic keratosis    Panaritium of finger    Paronychia of finger of right hand    Personal history of malignant melanoma of skin    Pleural effusion    Pure hypercholesterolemia    Right foot pain    Seasonal allergies    Sprain of right ankle    Syncope and collapse    Tension headache    Thumb pain, right    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

## 2025-04-28 NOTE — PATIENT INSTRUCTIONS
Call or message with concerns  Kimberly 3 script sent   Follow up with Dr Urias due to my upcoming move

## 2025-04-29 ENCOUNTER — TELEPHONE (OUTPATIENT)
Dept: ENDOCRINOLOGY | Facility: CLINIC | Age: 70
End: 2025-04-29
Payer: MEDICARE

## 2025-04-29 NOTE — TELEPHONE ENCOUNTER
Pt called in states that she increased her night dose of lantus to 14 units and she was up every 2 hours eating as she felt her blood sugar dropping. Pt states that it does not show that in her rachel but she had to get up every 2 hours to eat. Pt states she is not able to tolerate 14 units at bedtime.

## 2025-04-30 DIAGNOSIS — E78.00 ELEVATED LDL CHOLESTEROL LEVEL: ICD-10-CM

## 2025-04-30 RX ORDER — ATORVASTATIN CALCIUM 20 MG/1
20 TABLET, FILM COATED ORAL DAILY
Qty: 30 TABLET | Refills: 0 | Status: SHIPPED | OUTPATIENT
Start: 2025-04-30 | End: 2025-05-01 | Stop reason: SDUPTHER

## 2025-05-01 DIAGNOSIS — E78.00 ELEVATED LDL CHOLESTEROL LEVEL: ICD-10-CM

## 2025-05-01 RX ORDER — ATORVASTATIN CALCIUM 20 MG/1
20 TABLET, FILM COATED ORAL DAILY
Qty: 30 TABLET | Refills: 0 | Status: SHIPPED | OUTPATIENT
Start: 2025-05-01 | End: 2025-05-31

## 2025-05-05 ENCOUNTER — TELEPHONE (OUTPATIENT)
Dept: PRIMARY CARE | Facility: CLINIC | Age: 70
End: 2025-05-05
Payer: MEDICARE

## 2025-05-05 DIAGNOSIS — E78.00 ELEVATED LDL CHOLESTEROL LEVEL: ICD-10-CM

## 2025-05-05 RX ORDER — ATORVASTATIN CALCIUM 20 MG/1
20 TABLET, FILM COATED ORAL DAILY
Qty: 90 TABLET | Refills: 0 | Status: SHIPPED | OUTPATIENT
Start: 2025-05-05

## 2025-05-05 NOTE — TELEPHONE ENCOUNTER
Pt informed that she was decreased to 20mg due to her LFT's and for now per gs stay on the 20mg dose.

## 2025-05-19 ENCOUNTER — TELEPHONE (OUTPATIENT)
Facility: CLINIC | Age: 70
End: 2025-05-19
Payer: MEDICARE

## 2025-05-19 DIAGNOSIS — E10.9 TYPE 1 DIABETES MELLITUS WITHOUT COMPLICATION: ICD-10-CM

## 2025-05-19 RX ORDER — SEMAGLUTIDE 0.68 MG/ML
0.25 INJECTION, SOLUTION SUBCUTANEOUS WEEKLY
Qty: 9 ML | Refills: 0 | Status: SHIPPED | OUTPATIENT
Start: 2025-05-19 | End: 2026-05-19

## 2025-06-01 DIAGNOSIS — R03.0 BLOOD PRESSURE ELEVATED WITHOUT HISTORY OF HTN: ICD-10-CM

## 2025-06-01 DIAGNOSIS — B00.9 HSV (HERPES SIMPLEX VIRUS) INFECTION: ICD-10-CM

## 2025-06-01 DIAGNOSIS — E78.00 ELEVATED LDL CHOLESTEROL LEVEL: ICD-10-CM

## 2025-06-01 DIAGNOSIS — M81.0 OSTEOPOROSIS, UNSPECIFIED OSTEOPOROSIS TYPE, UNSPECIFIED PATHOLOGICAL FRACTURE PRESENCE: ICD-10-CM

## 2025-06-01 RX ORDER — IBANDRONATE SODIUM 150 MG/1
TABLET, FILM COATED ORAL
Qty: 3 TABLET | Refills: 1 | Status: SHIPPED | OUTPATIENT
Start: 2025-06-01

## 2025-06-02 RX ORDER — LOSARTAN POTASSIUM 50 MG/1
50 TABLET ORAL DAILY
Qty: 90 TABLET | Refills: 0 | Status: SHIPPED | OUTPATIENT
Start: 2025-06-02

## 2025-06-02 RX ORDER — ATORVASTATIN CALCIUM 40 MG/1
40 TABLET, FILM COATED ORAL DAILY
Qty: 90 TABLET | Refills: 0 | Status: SHIPPED | OUTPATIENT
Start: 2025-06-02

## 2025-06-02 RX ORDER — VALACYCLOVIR HYDROCHLORIDE 1 G/1
1000 TABLET, FILM COATED ORAL DAILY
Qty: 90 TABLET | Refills: 2 | Status: SHIPPED | OUTPATIENT
Start: 2025-06-02

## 2025-06-16 ENCOUNTER — TELEPHONE (OUTPATIENT)
Dept: PRIMARY CARE | Facility: CLINIC | Age: 70
End: 2025-06-16
Payer: MEDICARE

## 2025-06-17 DIAGNOSIS — E78.00 ELEVATED LDL CHOLESTEROL LEVEL: ICD-10-CM

## 2025-06-17 RX ORDER — ATORVASTATIN CALCIUM 20 MG/1
20 TABLET, FILM COATED ORAL DAILY
Qty: 90 TABLET | Refills: 0 | Status: SHIPPED | OUTPATIENT
Start: 2025-06-17

## 2025-07-07 ENCOUNTER — TELEPHONE (OUTPATIENT)
Facility: CLINIC | Age: 70
End: 2025-07-07
Payer: MEDICARE

## 2025-07-07 DIAGNOSIS — E10.9 TYPE 1 DIABETES MELLITUS WITHOUT COMPLICATION: Primary | ICD-10-CM

## 2025-07-07 RX ORDER — LANCETS 33 GAUGE
1 EACH MISCELLANEOUS 3 TIMES DAILY
Qty: 300 EACH | Refills: 0 | Status: SHIPPED | OUTPATIENT
Start: 2025-07-07 | End: 2026-07-07

## 2025-07-07 RX ORDER — BLOOD-GLUCOSE METER
1 EACH MISCELLANEOUS 3 TIMES DAILY
Qty: 300 STRIP | Refills: 0 | Status: SHIPPED | OUTPATIENT
Start: 2025-07-07 | End: 2026-07-07

## 2025-07-07 RX ORDER — DEXTROSE 4 G
1 TABLET,CHEWABLE ORAL DAILY
Qty: 1 EACH | Refills: 0 | Status: SHIPPED | OUTPATIENT
Start: 2025-07-07

## 2025-08-04 ENCOUNTER — APPOINTMENT (OUTPATIENT)
Dept: PRIMARY CARE | Facility: CLINIC | Age: 70
End: 2025-08-04
Payer: MEDICARE

## 2025-08-25 ENCOUNTER — APPOINTMENT (OUTPATIENT)
Dept: PRIMARY CARE | Facility: CLINIC | Age: 70
End: 2025-08-25
Payer: MEDICARE

## 2025-08-28 DIAGNOSIS — E78.00 ELEVATED LDL CHOLESTEROL LEVEL: ICD-10-CM

## 2025-08-28 DIAGNOSIS — R03.0 BLOOD PRESSURE ELEVATED WITHOUT HISTORY OF HTN: ICD-10-CM

## 2025-08-28 RX ORDER — ATORVASTATIN CALCIUM 20 MG/1
20 TABLET, FILM COATED ORAL DAILY
Qty: 90 TABLET | Refills: 0 | Status: SHIPPED | OUTPATIENT
Start: 2025-08-28

## 2025-08-28 RX ORDER — LOSARTAN POTASSIUM 50 MG/1
50 TABLET ORAL DAILY
Qty: 90 TABLET | Refills: 0 | Status: SHIPPED | OUTPATIENT
Start: 2025-08-28

## 2025-09-16 ENCOUNTER — APPOINTMENT (OUTPATIENT)
Dept: ENDOCRINOLOGY | Facility: CLINIC | Age: 70
End: 2025-09-16
Payer: MEDICARE

## 2025-09-22 ENCOUNTER — APPOINTMENT (OUTPATIENT)
Dept: PRIMARY CARE | Facility: CLINIC | Age: 70
End: 2025-09-22
Payer: MEDICARE

## 2025-09-29 ENCOUNTER — APPOINTMENT (OUTPATIENT)
Dept: OBSTETRICS AND GYNECOLOGY | Facility: CLINIC | Age: 70
End: 2025-09-29
Payer: MEDICARE

## 2025-10-13 ENCOUNTER — APPOINTMENT (OUTPATIENT)
Dept: OBSTETRICS AND GYNECOLOGY | Facility: CLINIC | Age: 70
End: 2025-10-13
Payer: MEDICARE